# Patient Record
Sex: MALE | Race: WHITE | NOT HISPANIC OR LATINO | Employment: OTHER | URBAN - METROPOLITAN AREA
[De-identification: names, ages, dates, MRNs, and addresses within clinical notes are randomized per-mention and may not be internally consistent; named-entity substitution may affect disease eponyms.]

---

## 2017-04-13 ENCOUNTER — COMMUNICATION - HEALTHEAST (OUTPATIENT)
Dept: SCHEDULING | Facility: CLINIC | Age: 52
End: 2017-04-13

## 2017-04-14 ENCOUNTER — OFFICE VISIT - HEALTHEAST (OUTPATIENT)
Dept: INTERNAL MEDICINE | Facility: CLINIC | Age: 52
End: 2017-04-14

## 2017-04-14 DIAGNOSIS — D64.9 ANEMIA: ICD-10-CM

## 2017-04-14 DIAGNOSIS — I10 ESSENTIAL HYPERTENSION WITH GOAL BLOOD PRESSURE LESS THAN 140/90: ICD-10-CM

## 2017-04-14 DIAGNOSIS — R06.02 SHORTNESS OF BREATH: ICD-10-CM

## 2017-04-14 LAB
ATRIAL RATE - MUSE: 77 BPM
DIASTOLIC BLOOD PRESSURE - MUSE: NORMAL MMHG
INTERPRETATION ECG - MUSE: NORMAL
P AXIS - MUSE: 57 DEGREES
PR INTERVAL - MUSE: 184 MS
QRS DURATION - MUSE: 78 MS
QT - MUSE: 374 MS
QTC - MUSE: 423 MS
R AXIS - MUSE: 23 DEGREES
SYSTOLIC BLOOD PRESSURE - MUSE: NORMAL MMHG
T AXIS - MUSE: 14 DEGREES
VENTRICULAR RATE- MUSE: 77 BPM

## 2017-04-14 ASSESSMENT — MIFFLIN-ST. JEOR: SCORE: 1767.72

## 2017-04-19 ENCOUNTER — COMMUNICATION - HEALTHEAST (OUTPATIENT)
Dept: INTERNAL MEDICINE | Facility: CLINIC | Age: 52
End: 2017-04-19

## 2017-04-27 ENCOUNTER — RECORDS - HEALTHEAST (OUTPATIENT)
Dept: ADMINISTRATIVE | Facility: OTHER | Age: 52
End: 2017-04-27

## 2017-05-25 ENCOUNTER — RECORDS - HEALTHEAST (OUTPATIENT)
Dept: ADMINISTRATIVE | Facility: OTHER | Age: 52
End: 2017-05-25

## 2017-06-23 ENCOUNTER — COMMUNICATION - HEALTHEAST (OUTPATIENT)
Dept: INTERNAL MEDICINE | Facility: CLINIC | Age: 52
End: 2017-06-23

## 2017-06-28 ENCOUNTER — COMMUNICATION - HEALTHEAST (OUTPATIENT)
Dept: INTERNAL MEDICINE | Facility: CLINIC | Age: 52
End: 2017-06-28

## 2017-07-05 ENCOUNTER — COMMUNICATION - HEALTHEAST (OUTPATIENT)
Dept: INTERNAL MEDICINE | Facility: CLINIC | Age: 52
End: 2017-07-05

## 2017-07-09 ENCOUNTER — COMMUNICATION - HEALTHEAST (OUTPATIENT)
Dept: INTERNAL MEDICINE | Facility: CLINIC | Age: 52
End: 2017-07-09

## 2017-07-09 DIAGNOSIS — I10 HYPERTENSION: ICD-10-CM

## 2017-07-13 ENCOUNTER — RECORDS - HEALTHEAST (OUTPATIENT)
Dept: ADMINISTRATIVE | Facility: OTHER | Age: 52
End: 2017-07-13

## 2017-08-11 ENCOUNTER — RECORDS - HEALTHEAST (OUTPATIENT)
Dept: ADMINISTRATIVE | Facility: OTHER | Age: 52
End: 2017-08-11

## 2017-09-11 ENCOUNTER — RECORDS - HEALTHEAST (OUTPATIENT)
Dept: ADMINISTRATIVE | Facility: OTHER | Age: 52
End: 2017-09-11

## 2017-11-06 ENCOUNTER — RECORDS - HEALTHEAST (OUTPATIENT)
Dept: ADMINISTRATIVE | Facility: OTHER | Age: 52
End: 2017-11-06

## 2017-12-07 ENCOUNTER — RECORDS - HEALTHEAST (OUTPATIENT)
Dept: ADMINISTRATIVE | Facility: OTHER | Age: 52
End: 2017-12-07

## 2018-01-05 ENCOUNTER — RECORDS - HEALTHEAST (OUTPATIENT)
Dept: ADMINISTRATIVE | Facility: OTHER | Age: 53
End: 2018-01-05

## 2018-02-08 ENCOUNTER — RECORDS - HEALTHEAST (OUTPATIENT)
Dept: ADMINISTRATIVE | Facility: OTHER | Age: 53
End: 2018-02-08

## 2018-03-07 ENCOUNTER — RECORDS - HEALTHEAST (OUTPATIENT)
Dept: ADMINISTRATIVE | Facility: OTHER | Age: 53
End: 2018-03-07

## 2018-03-15 ENCOUNTER — COMMUNICATION - HEALTHEAST (OUTPATIENT)
Dept: INTERNAL MEDICINE | Facility: CLINIC | Age: 53
End: 2018-03-15

## 2018-03-15 DIAGNOSIS — I10 HYPERTENSION: ICD-10-CM

## 2018-03-30 ENCOUNTER — COMMUNICATION - HEALTHEAST (OUTPATIENT)
Dept: INTERNAL MEDICINE | Facility: CLINIC | Age: 53
End: 2018-03-30

## 2018-04-05 ENCOUNTER — OFFICE VISIT - HEALTHEAST (OUTPATIENT)
Dept: INTERNAL MEDICINE | Facility: CLINIC | Age: 53
End: 2018-04-05

## 2018-04-05 DIAGNOSIS — Z12.5 ENCOUNTER FOR SCREENING FOR MALIGNANT NEOPLASM OF PROSTATE: ICD-10-CM

## 2018-04-05 DIAGNOSIS — Z12.11 SCREENING FOR COLON CANCER: ICD-10-CM

## 2018-04-05 DIAGNOSIS — I10 ESSENTIAL HYPERTENSION: ICD-10-CM

## 2018-04-05 DIAGNOSIS — Z00.00 ROUTINE GENERAL MEDICAL EXAMINATION AT A HEALTH CARE FACILITY: ICD-10-CM

## 2018-04-05 ASSESSMENT — MIFFLIN-ST. JEOR: SCORE: 1775.66

## 2018-04-06 ENCOUNTER — AMBULATORY - HEALTHEAST (OUTPATIENT)
Dept: LAB | Facility: CLINIC | Age: 53
End: 2018-04-06

## 2018-04-06 DIAGNOSIS — Z12.5 ENCOUNTER FOR SCREENING FOR MALIGNANT NEOPLASM OF PROSTATE: ICD-10-CM

## 2018-04-06 DIAGNOSIS — Z00.00 ROUTINE GENERAL MEDICAL EXAMINATION AT A HEALTH CARE FACILITY: ICD-10-CM

## 2018-04-06 LAB
ALBUMIN SERPL-MCNC: 4.3 G/DL (ref 3.5–5)
ALP SERPL-CCNC: 60 U/L (ref 45–120)
ALT SERPL W P-5'-P-CCNC: 47 U/L (ref 0–45)
ANION GAP SERPL CALCULATED.3IONS-SCNC: 14 MMOL/L (ref 5–18)
AST SERPL W P-5'-P-CCNC: 28 U/L (ref 0–40)
BILIRUB SERPL-MCNC: 0.5 MG/DL (ref 0–1)
BUN SERPL-MCNC: 17 MG/DL (ref 8–22)
CALCIUM SERPL-MCNC: 9.9 MG/DL (ref 8.5–10.5)
CHLORIDE BLD-SCNC: 98 MMOL/L (ref 98–107)
CHOLEST SERPL-MCNC: 272 MG/DL
CO2 SERPL-SCNC: 24 MMOL/L (ref 22–31)
CREAT SERPL-MCNC: 0.99 MG/DL (ref 0.7–1.3)
FASTING STATUS PATIENT QL REPORTED: YES
GFR SERPL CREATININE-BSD FRML MDRD: >60 ML/MIN/1.73M2
GLUCOSE BLD-MCNC: 110 MG/DL (ref 70–125)
HDLC SERPL-MCNC: 51 MG/DL
LDLC SERPL CALC-MCNC: 169 MG/DL
POTASSIUM BLD-SCNC: 4 MMOL/L (ref 3.5–5)
PROT SERPL-MCNC: 7.5 G/DL (ref 6–8)
PSA SERPL-MCNC: 1.4 NG/ML (ref 0–3.5)
SODIUM SERPL-SCNC: 136 MMOL/L (ref 136–145)
TRIGL SERPL-MCNC: 261 MG/DL

## 2018-04-10 ENCOUNTER — COMMUNICATION - HEALTHEAST (OUTPATIENT)
Dept: INTERNAL MEDICINE | Facility: CLINIC | Age: 53
End: 2018-04-10

## 2018-04-16 ENCOUNTER — COMMUNICATION - HEALTHEAST (OUTPATIENT)
Dept: INTERNAL MEDICINE | Facility: CLINIC | Age: 53
End: 2018-04-16

## 2018-06-16 ENCOUNTER — COMMUNICATION - HEALTHEAST (OUTPATIENT)
Dept: INTERNAL MEDICINE | Facility: CLINIC | Age: 53
End: 2018-06-16

## 2018-06-16 DIAGNOSIS — I10 HYPERTENSION: ICD-10-CM

## 2018-07-19 ENCOUNTER — RECORDS - HEALTHEAST (OUTPATIENT)
Dept: ADMINISTRATIVE | Facility: OTHER | Age: 53
End: 2018-07-19

## 2018-12-19 ENCOUNTER — RECORDS - HEALTHEAST (OUTPATIENT)
Dept: ADMINISTRATIVE | Facility: OTHER | Age: 53
End: 2018-12-19

## 2019-01-28 ENCOUNTER — TRANSFERRED RECORDS (OUTPATIENT)
Dept: HEALTH INFORMATION MANAGEMENT | Facility: CLINIC | Age: 54
End: 2019-01-28

## 2019-02-13 ENCOUNTER — TRANSFERRED RECORDS (OUTPATIENT)
Dept: HEALTH INFORMATION MANAGEMENT | Facility: CLINIC | Age: 54
End: 2019-02-13

## 2019-02-19 ENCOUNTER — RECORDS - HEALTHEAST (OUTPATIENT)
Dept: ADMINISTRATIVE | Facility: OTHER | Age: 54
End: 2019-02-19

## 2019-04-07 ENCOUNTER — COMMUNICATION - HEALTHEAST (OUTPATIENT)
Dept: INTERNAL MEDICINE | Facility: CLINIC | Age: 54
End: 2019-04-07

## 2019-04-07 DIAGNOSIS — I10 HYPERTENSION: ICD-10-CM

## 2019-05-03 ENCOUNTER — CARE COORDINATION (OUTPATIENT)
Dept: ANESTHESIOLOGY | Facility: CLINIC | Age: 54
End: 2019-05-03

## 2019-05-03 NOTE — PROGRESS NOTES
I called and spoke with Mel this afternoon at his request.     Pat wanted to discuss being seen for a thoracic RFA. He has had his bilateral nerve blocks done in [place] and one side of a thoracic RFA done. See records. He is wanting to get the other thoracic RFA done.    He was assisted to make a clinic appt, he will have his PCP fax in a referral for him to be seen in clinic to be cleared for the thoracic RFA.

## 2019-05-07 ENCOUNTER — OFFICE VISIT (OUTPATIENT)
Dept: ANESTHESIOLOGY | Facility: CLINIC | Age: 54
End: 2019-05-07
Payer: MEDICARE

## 2019-05-07 VITALS
HEART RATE: 113 BPM | RESPIRATION RATE: 16 BRPM | WEIGHT: 225 LBS | DIASTOLIC BLOOD PRESSURE: 95 MMHG | HEIGHT: 66 IN | BODY MASS INDEX: 36.16 KG/M2 | SYSTOLIC BLOOD PRESSURE: 135 MMHG

## 2019-05-07 DIAGNOSIS — M47.814 SPONDYLOSIS OF THORACIC REGION WITHOUT MYELOPATHY OR RADICULOPATHY: Primary | ICD-10-CM

## 2019-05-07 DIAGNOSIS — M25.78 DEGENERATION OF INTERVERTEBRAL DISC OF THORACIC REGION WITH OSTEOPHYTE: ICD-10-CM

## 2019-05-07 DIAGNOSIS — M51.34 DEGENERATION OF INTERVERTEBRAL DISC OF THORACIC REGION WITH OSTEOPHYTE: ICD-10-CM

## 2019-05-07 RX ORDER — HYDROCHLOROTHIAZIDE 25 MG/1
25 TABLET ORAL DAILY
COMMUNITY
Start: 2019-04-08 | End: 2022-03-28

## 2019-05-07 RX ORDER — LISINOPRIL 20 MG/1
TABLET ORAL DAILY
COMMUNITY
Start: 2019-04-08 | End: 2021-11-29

## 2019-05-07 RX ORDER — NALOXONE HYDROCHLORIDE 4 MG/.1ML
4 SPRAY NASAL
Refills: 1 | COMMUNITY
Start: 2018-06-09

## 2019-05-07 RX ORDER — OXYCODONE HYDROCHLORIDE 10 MG/1
20 TABLET ORAL PRN
COMMUNITY
Start: 2019-01-28 | End: 2022-03-28

## 2019-05-07 ASSESSMENT — MIFFLIN-ST. JEOR: SCORE: 1808.34

## 2019-05-07 ASSESSMENT — ENCOUNTER SYMPTOMS
STIFFNESS: 0
MYALGIAS: 1
MUSCLE WEAKNESS: 0
NECK PAIN: 0
BACK PAIN: 1
ARTHRALGIAS: 0
JOINT SWELLING: 0
MUSCLE CRAMPS: 0

## 2019-05-07 ASSESSMENT — ANXIETY QUESTIONNAIRES
7. FEELING AFRAID AS IF SOMETHING AWFUL MIGHT HAPPEN: NOT AT ALL
GAD7 TOTAL SCORE: 3
4. TROUBLE RELAXING: NOT AT ALL
5. BEING SO RESTLESS THAT IT IS HARD TO SIT STILL: NOT AT ALL
6. BECOMING EASILY ANNOYED OR IRRITABLE: NEARLY EVERY DAY
1. FEELING NERVOUS, ANXIOUS, OR ON EDGE: NOT AT ALL
3. WORRYING TOO MUCH ABOUT DIFFERENT THINGS: NOT AT ALL
2. NOT BEING ABLE TO STOP OR CONTROL WORRYING: NOT AT ALL
GAD7 TOTAL SCORE: 3
7. FEELING AFRAID AS IF SOMETHING AWFUL MIGHT HAPPEN: NOT AT ALL

## 2019-05-07 ASSESSMENT — PAIN SCALES - GENERAL: PAINLEVEL: MODERATE PAIN (5)

## 2019-05-07 NOTE — LETTER
"5/7/2019       RE: Luca Jack  [address]  [place] [address]     Dear Colleague,    Thank you for referring your patient, Luca Jack, to the [facility] at [facility]. Please see a copy of my visit note below.    I had the pleasure of meeting Mr. Luca Jack on 5/7/2019 in the outpatient pain clinic in consult for Dr. Law with regards to his thoracic back pain.   HPI:  Patient presents with past medical history of HTN, opioid dependence for therapeutic purpose; he is here today for evaluation of thoracic back pain.  He was in [place] for the winter where he underwent thoracic medial branch blocks with excellent relief. He had right-sided RFA performed on 2/13/19 but needed to return to [place] prior to left side being performed. He is here today for evaluation of left-sided thoracic RFA. All records have been obtained and reviewed from [facility].        He has attended multiple pain clinics in the past, including [facility], [facility], [facility], and [facility]. He is motivated to not utilize opioid therapy.     Location: midline thoracic back  Quality: spasm, aching    Severity: 5/10  Timing: constant; waxes and wanes  Duration: 2006  Context: no inciting event  Aggravating Factors: stress   Relieving Factors: relaxation, lying down, R thoracic RFA  Associated Signs/Symptoms: denies radicular symptoms     He has tried the following therapies for his pain:  Medications:   Current:  -oxycodone 20 mg PRN  Past:  -acetaminophen 4 g/day  -gabapentin; s/e   -morphine  -Fentanyl patch  Physical Therapy:  Multiple sessions of PT; patient feels PT \"core strengthening\" worsens pain  Injections/Interventions:  -T9, 10, 11, 12 bilateral MBB; 1/9/19 - 80-90% relief  -T9, 10, 11, 12 bilateral MBB; 1/16/19 - 80-90% relief  -T9, 10, 11, 12 right radiofrequency ablation; 2/13/19  Pain Psychology: yes; " "not helpful     Other Modalities:  Chiropractic care: yes; summer 2018  Acupuncture: yes; summer 2018  TENS: yes  Water Based Therapy: yes; helpful    No past medical history on file. past medical history reviewed with patient.   No past surgical history on file. past surgical history reviewed with patient.   Medications:  Current Outpatient Medications   Medication     hydrochlorothiazide (HYDRODIURIL) 25 MG tablet     lisinopril (PRINIVIL/ZESTRIL) 20 MG tablet     NARCAN 4 MG/0.1ML nasal spray     oxyCODONE IR (ROXICODONE) 10 MG tablet     No current facility-administered medications for this visit.      A multi-state Prescription Monitoring Program reviewed and no aberrancies noted.     Allergies:   No Known Allergies  Family History:  No family history on file.   Social History     Tobacco Use     Smoking status: Never Smoker   Substance Use Topics     Alcohol use: Not on file     Drug use: Not on file          ROS:  A 14 point review of systems was completed; results are listed at end of note.     Objective:   BP (!) 135/95   Pulse 113   Resp 16   Ht 1.676 m (5' 6\")   Wt 102.1 kg (225 lb)   BMI 36.32 kg/m     Body mass index is 36.32 kg/m .  General: In no apparent distress  Mental status: Normal mood and affect, pleasant  Neuro: Alert, oriented. No sensory deficits.   Head: Atraumatic, normocephalic  Eyes: Extra-ocular movements grossly intact, no scleral icterus  Neck: Supple, Range of motion full  Respiratory: Non-labored breathing; No respiratory distress. Lungs CTA  CV: Normal S1, S2  Skin: No rashes or lesions noted on exposed areas of skin  Msk: Thoracic spine.   No spinous process tenderness with palpation of thoracic vertebrae. Paraspinal hypertrophy.   FROM with flexion, extension, and lateral bending without pain.  Strength 5/5 bilaterally: grasp, deltoid, biceps, triceps.   Gait is slow; antalgic, but symmetrical.    Assessment:  Luca Jack is a 53 year old male who is seen at the pain " clinic for multilevel thoracic spondylosis with osteophyte complex at T11-12. Strong myofascial component.     Plan:   As patient has obtained 80-90% relief from bilateral thoracic medial branch blocks, will plan to proceed with radiofrequency ablation of T9, T10, T11, T12 on LEFT. Procedure explained to patient and all questions answered. He will follow up in clinic 4-6 weeks following procedure.     Total time spent was  25 minutes, and more than 50% of face to face time was spent in counseling and/or coordination of care regarding the above plan.    Thank you for the consult.   TULIO Prieto, [facility]  Pain and Interventional Clinic          Answers for HPI/ROS submitted by the patient on 5/7/2019   MARLI 7 TOTAL SCORE: 3  General Symptoms: No  Skin Symptoms: No  HENT Symptoms: No  EYE SYMPTOMS: No  HEART SYMPTOMS: No  LUNG SYMPTOMS: No  INTESTINAL SYMPTOMS: No  URINARY SYMPTOMS: No  REPRODUCTIVE SYMPTOMS: No  SKELETAL SYMPTOMS: Yes  BLOOD SYMPTOMS: No  NERVOUS SYSTEM SYMPTOMS: No  MENTAL HEALTH SYMPTOMS: No  Back pain: Yes  Muscle aches: Yes  Neck pain: No  Swollen joints: No  Joint pain: No  Bone pain: No  Muscle cramps: No  Muscle weakness: No  Joint stiffness: No  Bone fracture: No      Again, thank you for allowing me to participate in the care of your patient.      Sincerely,    TULIO Prieto CNP

## 2019-05-07 NOTE — NURSING NOTE
LPN reviewed AVS with Pt.  Pt verbalized an understanding of information, and was asked to contact clinic with questions.    LPN read through the instructions with pt for the recommended procedure: Left sided Thoracic Radio-frequency Ablation.  Pt verbalized understanding to holding appropriate medication per protocol, and was agreeable to NPO policy and needing a .    Dari Lewis LPN

## 2019-05-07 NOTE — LETTER
Date:May 8, 2019      Patient was self referred, no letter generated. Do not send.        [facility] Information

## 2019-05-07 NOTE — PROGRESS NOTES
"I had the pleasure of meeting Mr. Luca Jack on 5/7/2019 in the outpatient pain clinic in consult for Dr. Law with regards to his thoracic back pain.   HPI:  Patient presents with past medical history of HTN, opioid dependence for therapeutic purpose; he is here today for evaluation of thoracic back pain.  He was in [place] for the winter where he underwent thoracic medial branch blocks with excellent relief. He had right-sided RFA performed on 2/13/19 but needed to return to [place] prior to left side being performed. He is here today for evaluation of left-sided thoracic RFA. All records have been obtained and reviewed from [facility].        He has attended multiple pain clinics in the past, including [facility], [facility], [facility], and [facility]. He is motivated to not utilize opioid therapy.     Location: midline thoracic back  Quality: spasm, aching    Severity: 5/10  Timing: constant; waxes and wanes  Duration: 2006  Context: no inciting event  Aggravating Factors: stress   Relieving Factors: relaxation, lying down, R thoracic RFA  Associated Signs/Symptoms: denies radicular symptoms     He has tried the following therapies for his pain:  Medications:   Current:  -oxycodone 20 mg PRN  Past:  -acetaminophen 4 g/day  -gabapentin; s/e   -morphine  -Fentanyl patch  Physical Therapy:  Multiple sessions of PT; patient feels PT \"core strengthening\" worsens pain  Injections/Interventions:  -T9, 10, 11, 12 bilateral MBB; 1/9/19 - 80-90% relief  -T9, 10, 11, 12 bilateral MBB; 1/16/19 - 80-90% relief  -T9, 10, 11, 12 right radiofrequency ablation; 2/13/19  Pain Psychology: yes; not helpful     Other Modalities:  Chiropractic care: yes; summer 2018  Acupuncture: yes; summer 2018  TENS: yes  Water Based Therapy: yes; helpful    No past medical history on file. past medical history reviewed with patient.   No past surgical history on file. past surgical history reviewed with patient. " "  Medications:  Current Outpatient Medications   Medication     hydrochlorothiazide (HYDRODIURIL) 25 MG tablet     lisinopril (PRINIVIL/ZESTRIL) 20 MG tablet     NARCAN 4 MG/0.1ML nasal spray     oxyCODONE IR (ROXICODONE) 10 MG tablet     No current facility-administered medications for this visit.      A multi-state Prescription Monitoring Program reviewed and no aberrancies noted.     Allergies:   No Known Allergies  Family History:  No family history on file.   Social History     Tobacco Use     Smoking status: Never Smoker   Substance Use Topics     Alcohol use: Not on file     Drug use: Not on file          ROS:  A 14 point review of systems was completed; results are listed at end of note.     Objective:   BP (!) 135/95   Pulse 113   Resp 16   Ht 1.676 m (5' 6\")   Wt 102.1 kg (225 lb)   BMI 36.32 kg/m    Body mass index is 36.32 kg/m .  General: In no apparent distress  Mental status: Normal mood and affect, pleasant  Neuro: Alert, oriented. No sensory deficits.   Head: Atraumatic, normocephalic  Eyes: Extra-ocular movements grossly intact, no scleral icterus  Neck: Supple, Range of motion full  Respiratory: Non-labored breathing; No respiratory distress. Lungs CTA  CV: Normal S1, S2  Skin: No rashes or lesions noted on exposed areas of skin  Msk: Thoracic spine.   No spinous process tenderness with palpation of thoracic vertebrae. Paraspinal hypertrophy.   FROM with flexion, extension, and lateral bending without pain.  Strength 5/5 bilaterally: grasp, deltoid, biceps, triceps.   Gait is slow; antalgic, but symmetrical.    Assessment:  Luca Jack is a 53 year old male who is seen at the pain clinic for multilevel thoracic spondylosis with osteophyte complex at T11-12. Strong myofascial component.     Plan:   As patient has obtained 80-90% relief from bilateral thoracic medial branch blocks, will plan to proceed with radiofrequency ablation of T9, T10, T11, T12 on LEFT. Procedure explained to " patient and all questions answered. He will follow up in clinic 4-6 weeks following procedure.     Total time spent was  25 minutes, and more than 50% of face to face time was spent in counseling and/or coordination of care regarding the above plan.    Thank you for the consult.   TULIO Prieto, [facility]  Pain and Interventional Clinic          Answers for HPI/ROS submitted by the patient on 5/7/2019   MARLI 7 TOTAL SCORE: 3  General Symptoms: No  Skin Symptoms: No  HENT Symptoms: No  EYE SYMPTOMS: No  HEART SYMPTOMS: No  LUNG SYMPTOMS: No  INTESTINAL SYMPTOMS: No  URINARY SYMPTOMS: No  REPRODUCTIVE SYMPTOMS: No  SKELETAL SYMPTOMS: Yes  BLOOD SYMPTOMS: No  NERVOUS SYSTEM SYMPTOMS: No  MENTAL HEALTH SYMPTOMS: No  Back pain: Yes  Muscle aches: Yes  Neck pain: No  Swollen joints: No  Joint pain: No  Bone pain: No  Muscle cramps: No  Muscle weakness: No  Joint stiffness: No  Bone fracture: No

## 2019-05-07 NOTE — PATIENT INSTRUCTIONS
1. Call to schedule you procedure.     When calling to schedule your procedure appointment, also make your follow up clinic appointment 4-6 weeks after the procedure.    Please call [phone] to schedule, reschedule, or cancel your procedure appointment.   Phones are answered Monday - Friday from 7:30 - 4:00pm.  Leave a voicemail with your name, birth date, and phone number if no one is available to take your call.     Your procedure: Left sided Thoracic Radio-frequency Ablation.    On the day of the procedure  1. Arrive 1 hour earlier than your scheduled time, to the [facility]  Address: [address] [place] [id number]  2. Check in on the 5th floor for your procedure    If you must reschedule your procedure more than two times, you must follow up in clinic before rescheduling again.    Preparing for your procedure    CAUTION - FAILURE TO FOLLOW THESE PRE-PROCEDURE INSTRUCTIONS WILL RESULT IN YOUR PROCEDURE BEING RESCHEDULED.            You must have a  take you home after your procedure. Transportation by taxi or para-transit is okay as long as you have a responsible adult accompany you. You must provide your 's full name and contact number at time of check in.     Fasting Protocol You may have NOTHING SOLID TO EAT 8 HOURS prior to arrival at the procedure area.     You may have CLEAR LIQUIDS UP TO 2 HOURS prior to arrival.    Broth and candy are considered solid food and require an eight hour fast.     Clear liquids include water, clear fruit juice (no pulp), carbonated beverages, ice, black coffee, black tea, clear jello. No alcohol containing beverages.   Medications If you take any medications, DO NOT STOP. Take your medications as usual the day of your procedure with a sip of water AT LEAST 2 HOURS PRIOR TO ARRIVAL.    Antibiotics If you are currently taking antibiotics, you must complete the entire dose 7 days prior to your scheduled procedure. You must be clear  of any signs or symptoms of infection. If you begin antibiotics, please contact our clinic for instructions.     Fever, Chills, or Rash If you experience a fever of higher than 100 degrees, chills, rash, or open wounds during the one week before your procedure, please call the clinic to see if you may proceed with your procedure.      Medication Hold List  **Patients under Cardiology/Neurology care should consult their provider prior to the pain procedure to verify pre-procedure medication instructions. The information below contains general guidelines.**    Blood Thinners If you are taking daily ASPIRIN, PLAVIX, OR OTHER BLOOD THINNERS SUCH AS COUMADIN/WARFARIN, we will need your prescribing doctor to sign a release permitting you to stop these medications. Once approved by your prescribing doctor - STOP ALL BLOOD THINNERS BASED ON THE TIME TABLE BELOW PRIOR TO YOUR PROCEDURE. If you have been instructed to stop WARFARIN(COUMADIN), you must have an INR lab drawn the day before your procedure. . Your INR must be within normal limits before we can perform your injection. MEDICATIONS CAN BE RESTARTED AFTER YOUR PROCEDURE.    14 DAY HOLD  Ticlid (ticlopidine)    10 DAY HOLD  Effient (Prasugel)    3 DAY HOLD  Xarelto (rivaroxaban) 7 DAY HOLD  Anacin, Bufferin, Ecotrin, Excedrin, Aggrenox (Aspirin)  Brilinta (ticagrelor)  Coumadin (Warfarin)  Pradexa (Dabigatran)  Elmiron (Pentosan)  Plavix (Clopidogrel Bisulfate)  Pletal (Cilostazol)    24 HOUR HOLD  Lovenox (enoxaparin)  Agrylin (Anagrelide)        Non-steroidal Anti-inflammatories (NSAIDs) DO NOT TAKE any non-steroidal anti-inflammatory medications (NSAIDs) listed on the table below. MEDICATIONS CAN BE RESTARTED AFTER YOUR PROCEDURE. Celebrex is OK to take and does not need to be discontinued.     Medications to stop:  3 DAY HOLD  Advil, Motrin (Ibuprofen)  Arthrotec (diciofenac sodium/misoprostol)  Clinoril (Sulindac)  Indocin (Indomethacin)  Lodine  (Etodolac)  Toradol (Ketorolac)  Vicoprofen (Hydrocodone and Ibuprofen)  Voltaren (Diclotenac)    14 DAY HOLD  Daypro (Oxaprozin)  Feldene (Piroxicam)   7 DAY HOLD  Aleve (Naproxen sodium)  Darvon compound (contains aspirin)  Naprosyn (Naproxen)  Norgesic Forte (contains aspirin)  Mobic (Meloxicam)  Oruvall (Ketoprofen)  Percodan (contains aspirin)  Relafen (Nabumetone)  Salsalate  Trilisate  Vitamin E (more than 400 mg per day)  Any medication containing aspirin                To speak with a nurse, schedule/reschedule/cancel a clinic appointment, or request a medication refill call: [phone]     You can also reach us by [employer]: [email or web address]

## 2019-05-08 ASSESSMENT — ANXIETY QUESTIONNAIRES: GAD7 TOTAL SCORE: 3

## 2019-05-10 ENCOUNTER — TELEPHONE (OUTPATIENT)
Dept: ANESTHESIOLOGY | Facility: CLINIC | Age: 54
End: 2019-05-10

## 2019-05-10 ENCOUNTER — OFFICE VISIT - HEALTHEAST (OUTPATIENT)
Dept: INTERNAL MEDICINE | Facility: CLINIC | Age: 54
End: 2019-05-10

## 2019-05-10 DIAGNOSIS — Z12.11 SCREEN FOR COLON CANCER: ICD-10-CM

## 2019-05-10 DIAGNOSIS — I10 HYPERTENSION: ICD-10-CM

## 2019-05-10 LAB
ALBUMIN SERPL-MCNC: 4.2 G/DL (ref 3.5–5)
ALP SERPL-CCNC: 52 U/L (ref 45–120)
ALT SERPL W P-5'-P-CCNC: 29 U/L (ref 0–45)
ANION GAP SERPL CALCULATED.3IONS-SCNC: 11 MMOL/L (ref 5–18)
AST SERPL W P-5'-P-CCNC: 22 U/L (ref 0–40)
BILIRUB SERPL-MCNC: 0.3 MG/DL (ref 0–1)
BUN SERPL-MCNC: 19 MG/DL (ref 8–22)
CALCIUM SERPL-MCNC: 10 MG/DL (ref 8.5–10.5)
CHLORIDE BLD-SCNC: 105 MMOL/L (ref 98–107)
CO2 SERPL-SCNC: 24 MMOL/L (ref 22–31)
CREAT SERPL-MCNC: 1.08 MG/DL (ref 0.7–1.3)
GFR SERPL CREATININE-BSD FRML MDRD: >60 ML/MIN/1.73M2
GLUCOSE BLD-MCNC: 119 MG/DL (ref 70–125)
POTASSIUM BLD-SCNC: 4.2 MMOL/L (ref 3.5–5)
PROT SERPL-MCNC: 7 G/DL (ref 6–8)
SODIUM SERPL-SCNC: 140 MMOL/L (ref 136–145)

## 2019-05-10 NOTE — TELEPHONE ENCOUNTER
Spoke with: Aliya     Date Scheduled:  6/17/19     Provider: Dr. Spence     : Yes     F/U Appointment: 7/29/19     Patient was educated on pre-op nurse call: Yes      Direct procedure:   No

## 2019-05-10 NOTE — TELEPHONE ENCOUNTER
Attempted to reach out to patient to assist with scheduling injection. Left detailed message informing patient that first available would be 5/21in the AM with Dr. Lawrence. Left best call back number of [phone]

## 2019-05-13 ENCOUNTER — COMMUNICATION - HEALTHEAST (OUTPATIENT)
Dept: INTERNAL MEDICINE | Facility: CLINIC | Age: 54
End: 2019-05-13

## 2019-05-24 ENCOUNTER — AMBULATORY - HEALTHEAST (OUTPATIENT)
Dept: NURSING | Facility: CLINIC | Age: 54
End: 2019-05-24

## 2019-06-16 ENCOUNTER — ANESTHESIA EVENT (OUTPATIENT)
Dept: SURGERY | Facility: AMBULATORY SURGERY CENTER | Age: 54
End: 2019-06-16

## 2019-06-17 ENCOUNTER — ANCILLARY PROCEDURE (OUTPATIENT)
Dept: RADIOLOGY | Facility: AMBULATORY SURGERY CENTER | Age: 54
End: 2019-06-17
Attending: ANESTHESIOLOGY
Payer: MEDICARE

## 2019-06-17 ENCOUNTER — HOSPITAL ENCOUNTER (OUTPATIENT)
Facility: AMBULATORY SURGERY CENTER | Age: 54
End: 2019-06-17
Attending: ANESTHESIOLOGY
Payer: MEDICARE

## 2019-06-17 ENCOUNTER — ANESTHESIA (OUTPATIENT)
Dept: SURGERY | Facility: AMBULATORY SURGERY CENTER | Age: 54
End: 2019-06-17

## 2019-06-17 VITALS
TEMPERATURE: 98.2 F | SYSTOLIC BLOOD PRESSURE: 123 MMHG | RESPIRATION RATE: 16 BRPM | OXYGEN SATURATION: 93 % | HEIGHT: 66 IN | BODY MASS INDEX: 33.75 KG/M2 | HEART RATE: 97 BPM | DIASTOLIC BLOOD PRESSURE: 76 MMHG | WEIGHT: 210 LBS

## 2019-06-17 DIAGNOSIS — R52 PAIN: ICD-10-CM

## 2019-06-17 RX ORDER — BUPIVACAINE HYDROCHLORIDE 5 MG/ML
INJECTION, SOLUTION PERINEURAL PRN
Status: DISCONTINUED | OUTPATIENT
Start: 2019-06-17 | End: 2019-06-17 | Stop reason: HOSPADM

## 2019-06-17 RX ORDER — DEXAMETHASONE SODIUM PHOSPHATE 10 MG/ML
INJECTION, SOLUTION INTRAMUSCULAR; INTRAVENOUS PRN
Status: DISCONTINUED | OUTPATIENT
Start: 2019-06-17 | End: 2019-06-17 | Stop reason: HOSPADM

## 2019-06-17 RX ORDER — FENTANYL CITRATE 50 UG/ML
25-50 INJECTION, SOLUTION INTRAMUSCULAR; INTRAVENOUS EVERY 5 MIN PRN
Status: DISCONTINUED | OUTPATIENT
Start: 2019-06-17 | End: 2019-06-18 | Stop reason: HOSPADM

## 2019-06-17 RX ORDER — NALOXONE HYDROCHLORIDE 0.4 MG/ML
.1-.4 INJECTION, SOLUTION INTRAMUSCULAR; INTRAVENOUS; SUBCUTANEOUS
Status: DISCONTINUED | OUTPATIENT
Start: 2019-06-17 | End: 2019-06-18 | Stop reason: HOSPADM

## 2019-06-17 RX ORDER — LIDOCAINE HYDROCHLORIDE 10 MG/ML
INJECTION, SOLUTION EPIDURAL; INFILTRATION; INTRACAUDAL; PERINEURAL PRN
Status: DISCONTINUED | OUTPATIENT
Start: 2019-06-17 | End: 2019-06-17 | Stop reason: HOSPADM

## 2019-06-17 RX ORDER — FLUMAZENIL 0.1 MG/ML
0.2 INJECTION, SOLUTION INTRAVENOUS
Status: DISCONTINUED | OUTPATIENT
Start: 2019-06-17 | End: 2019-06-18 | Stop reason: HOSPADM

## 2019-06-17 ASSESSMENT — MIFFLIN-ST. JEOR: SCORE: 1740.3

## 2019-06-17 NOTE — DISCHARGE INSTRUCTIONS
Home Care Instructions after a Radio Frequency Ablation    A radiofrequency ablation is a procedure that destroys the functionality of the nerve using radiofrequency energy. There are two primary types of radiofrequency ablation: A medial branch neurotomy (ablation) affects the nerves carrying pain from the facet joints, while a lateral branch neurotomy (ablation) affects nerves that carry pain from the sacroiliac joints. The physician uses x-ray guidance (fluoroscopy) to direct a special (radiofrequency) needle alongside the medial or lateral branch nerves. A small amount of electrical current is often carefully passed through the needle to assure it is next to the target nerve and a safe distance from other nerves. This current should briefly recreate the usual pain and cause a muscle twitch in the target area. The nerves will then be numbed to minimize pain while the lesion is being created. The radiofrequency waves are introduced to heat the tip of the needle and a heat lesion is created on the nerve to disrupt the nerve's ability to send pain signals. Please follow the aftercare instructions regarding your procedure.    Activity  -Rest today  -Do not work today  -Resume normal activity in 2-3 days  -No heavy lifting, turning or twisting for 24 hours  -DO NOT shower or soak in tub for 24 hours  -DO NOT remove bandaid for 24 hours    Pain  -You may experience soreness at the injection site for one or two days  -You may use an ice pack for 20 minutes every 2 hours for the first 24 hours, longer if needed for comfort, do not use heat  -You may use Tylenol (acetaminophen) every 4 hours or other pain medicines as directed by your physician  -If other pain medications are prescribed by your physician, please follow dosing instructions carefully.    What to expect  You may experience numbness radiating into your legs or arms (depending on the procedure location). This numbness may last several hours. Until sensation  returns to normal, please use caution in walking, climbing stairs, and stepping out of your vehicle, etc. Relief of your initial symptoms can take up to 4 weeks to feel better, this is normal and due to the healing process. The procedure site may feel like a deep burn. Using ice will greatly minimize this discomfort. Do not use numbing creams or patches over your injection sites immediately following your procedure. Please keep injection sites covered, clean and dry for 24 hours.    DID YOU RECEIVE SEDATION TODAY?  Yes    Safety  Sedation medicine, if given, may remain active for many hours. It is important for the next 24 hours that you do not:  -Drive a car  -Operate machines or power tools  -Consume alcohol, including beer  -Sign any important papers or legal documents  -Please have a responsible adult with you for 24 hours following any sedation    DID YOU RECEIVE STEROIDS TODAY?  Yes    Common side effects of steroids:  Not everyone will experience corticosteroid side effects. If side effects are experienced, they will gradually subside in the 7-10 day period following an injection. Most common side effects include:  -Flushed face and/or chest  -Feeling of warmth, particularly in the face but could be an overall feeling of warmth  -Increased blood sugar in diabetic patients  -Menstrual irregularities my occur. If taking hormone-based birth control an alternate method of birth control is recommended  -Sleep disturbances and/or mood swings are possible  -Leg cramps      Please contact us if you have:  -Severe pain  -Fever more than 101.5 degrees Fahrenheit  -Signs of infection at the injection site (redness, swelling, or drainage)    If you have questions, please contact our office at [phone] between the hours of 7:00 am and 3:00 pm Monday through Friday. After office hours you can contact the on call provider by dialing [phone]. If you need immediate attention, we recommend that you go to a hospital  emergency room or dial 911.

## 2019-06-17 NOTE — OP NOTE
Patient: Luca Jack Age: 53 year old   MRN: [id number] Attending: Dr. Spence     Date of Visit: June 17, 2019      PAIN MEDICINE CLINIC PROCEDURE NOTE    ATTENDING CLINICIAN:    Jj Spence MD    ASSISTANT CLINICIAN:  Susan Centeno MD    PREPROCEDURE DIAGNOSES:  1. Thoracic facet arthropathy   2. Chronic low back pain     POSTPROCEDURE DIAGNOSES:  1. Thoracic facet arthropathy   2. Chronic low back pain     PROCEDURE(S) PERFORMED:  1. Procedure(s):  Left Radiofrequency Ablation of the Thoracic Facets, Thoracic 9, 10, 11, 12  2.  Fluoroscopic guidance for the above procedures    ANESTHESIA:  Local.    BLOOD LOSS:  Minimal.    DRAINS AND SPECIMENS:  None.    COMPLICATIONS:  None.    INDICATIONS:    Luca Jack is a 53 year old male with a history of back pain secondary to thoracolumbar facet joint arthopathy .  The patient stated that the patient was in their usual state of health and denied recent anticoagulant use or recent infections.  Therefore, the plan is for Procedure(s):  Left Radiofrequency Ablation of the Thoracic Facets, Thoracic 9, 10, 11, 12    Procedure Details:    The patient was met in the procedure room, where the patient was identified by name, medical record number and date of birth.  All of the patient s last minute questions were answered. Written informed consent was obtained and saved in the electronic medical record, after the risks, benefits, and alternatives were discussed with the patient.      A formal time-out procedure was performed by Dr. Spence, as per protocol, including patient name, title of procedure, and site of procedure, and all in the room concurred.  Routine monitors were applied.      The patient was placed in the prone position on the procedure room table.  All pressure points were checked and comfortably padded.  Routine monitors were placed.  Vital signs were stable.    A chlorhexidine prep was completed followed by sterile draping per standard procedure.      We identified each thoracic vertebral body after first identifying the 12th rib.  AP fluoroscopy was then obliqued towards the patient's left in order to identify the pedicles of the leftT10 vertebrae.  The targets were recognized at the 10 o'clock position of the left pedicle, which is a known location for T9 medial branch nerve. Skin and subcutaneous tissues overlying this area were anesthetized with a 1% lidocaine.  Under fluoroscopic guidance, we directed the 100 mm RFAl needle through the skin and subcutaneous tissues until osseous contact was achieved.  At that point, the stylet was removed 0.5 mL solution containing  lidcaine was injected. The exact same procedure repeated for  left T10, T11 medial branch nerves by placing needle at the 10 oclock position of T11, T12 pedicles. For L1 medial branch nerve, we rotated C-arm to 15 degrees on the left side and placed RFA needle at the junction of transverse process and superior articular process of the L1 vertebrae.     At that point, the stylet was removed from the RF needle and an RFA probe was then inserted. Sensory testing was appropriately responsive.  Motor testing was initiated with appropriate multifidus responses.  Ablation was then carried out at 80 degrees Celsius for 90 seconds. Then RFA needle is rotated to 180 degree a ablation was then commenced at the above settings for a second lesion. Once the RF lesion was completed, 1 cubic centimeter of a solution consisting of 1 cubic centimeter of 40 mg per cubic centimeters triamcinolone and 7 cubic centimeters of 0.25% bupivacaine was injected through each RFA needle for a total of 1 cubic centimeters at each level.        Light pressure was held at the puncture site(s) to prevent ecchymosis and oozing.  The patient's skin was cleansed, and hemostasis was confirmed.  Band-aids were applied to the needle injection site(s).      Condition:    The patient tolerated the procedure well and was monitored for  approximately 15 minutes afterward in the post procedure area.  There were no immediate post procedure complications noted.  The patient was then discharged to home as per protocol.     Patient condition  stable.        Pre-procedure pain score: 7/10  Post-procedure pain score: 0/10

## 2019-06-19 NOTE — H&P
"Abbreviated History and Physical    Patient Name: Luca Jack  YOB: 1965    Reason for Procedure: chronic pain    History:    No past medical history on file.    History of obstructive sleep apnea? No     History of problems with sedation? no    Physical exam:  /76   Pulse 97   Temp 98.2  F (36.8  C) (Temporal)   Resp 16   Ht 1.676 m (5' 6\")   Wt 95.3 kg (210 lb)   SpO2 93%   BMI 33.89 kg/m    General: in no apparent distress   Neuro: At least antigravity strength noted in all 4 extremities  Respiratory: Clear to ausculation bilaterally   Cardiac: Regular rate and rhythm  Skin: No rashes or lesions on exposed areas of skin    Medications:   Current Outpatient Medications   Medication     hydrochlorothiazide (HYDRODIURIL) 25 MG tablet     lisinopril (PRINIVIL/ZESTRIL) 20 MG tablet     oxyCODONE IR (ROXICODONE) 10 MG tablet     NARCAN 4 MG/0.1ML nasal spray     No current facility-administered medications for this encounter.        Allergies:   No Known Allergies    ASA Classification: 2    OK for local anesthetic use.     Jj Spence MD  Pain Medicine  [facility] Department of Anesthesia  6/19/2019        "

## 2019-06-26 ENCOUNTER — NURSE TRIAGE (OUTPATIENT)
Dept: CALL CENTER | Age: 54
End: 2019-06-26

## 2019-06-26 NOTE — TELEPHONE ENCOUNTER
[facility]: Nurse Triage Note  SITUATION/BACKGROUND                                                      Luca Jack is a 53 year old male who's mother calls for him with post procedure pain.    Description:  Sharp stabbing pain in back. It is the same as before the procedure. Steroid Headache as well.  Onset/duration:  Headache for the past 4-5 days, back pain past 2 days  Precip. factors:  Headache mom states is related to steroids use in procedure and back pain pt feels it is the procedure wearing off.  Associated symptoms:  none  Improves/worsens symptoms:  nothing  Pain scale (0-10)   9-10/10 per mom - patient was sleeping    MEDICATIONS:   Taking medication(s) as prescribed? N/A  Taking over the counter medication(s?) N/A  Any barriers to taking medication(s) as prescribed?  N/A  Medication(s) improving/managing symptoms?  N/A    Allergies: No Known Allergies    ASSESSMENT      Patient  Is SP Left Radiofrequency Ablation of the Thoracic Facets, Thoracic 9, 10, 11, 12 done on 6-17-19. His back pain has returned and it is difficult for him to walk due to pain - no leg weakness. The pain is in his back and does not radiate. No loss of bowel or bladder. He is also experiencing a severe headache for the past 4-5 days - mom states it is related to the steroids used during the procedure.  Patient is currently not using any oral pain medications.    Message will be sent to the pain clinic to address the pain and plan next steps.    [phone]    RECOMMENDATION/PLAN                                                      RECOMMENDED DISPOSITION:  Message sent to the clinic to call the patient back with plan.  Will comply with recommendation: Yes    If further questions/concerns or if symptoms do not improve, worsen or new symptoms develop, call your PCP or [phone] to talk with the Resident on call, as soon as possible.    Guideline used: back pain  Telephone Triage Protocols for Nurses,  Fifth Edition, Julie Briggs Kathleen M. Doege, RN

## 2019-06-26 NOTE — TELEPHONE ENCOUNTER
RNCC called back and spoke with pt's mother.  She was advised to have pt take OTC ibuprofen 400-600mg q6-8h PRN for pain, in addition to heat/cold therapy and possibly lidocaine topical gel as directed on package.  I advised pt's mother Aliya to call back if needed, but to anticipate symptoms starting to resolve over the next 2-3 weeks.  I encouraged her to call back at any time if needed.     Kusum Tesfaye, TRANG, BSN

## 2019-07-09 ENCOUNTER — OFFICE VISIT (OUTPATIENT)
Dept: ANESTHESIOLOGY | Facility: CLINIC | Age: 54
End: 2019-07-09
Payer: MEDICARE

## 2019-07-09 VITALS — SYSTOLIC BLOOD PRESSURE: 135 MMHG | OXYGEN SATURATION: 97 % | DIASTOLIC BLOOD PRESSURE: 97 MMHG | HEART RATE: 102 BPM

## 2019-07-09 DIAGNOSIS — G89.29 CHRONIC MIDLINE THORACIC BACK PAIN: Primary | ICD-10-CM

## 2019-07-09 DIAGNOSIS — M47.814 SPONDYLOSIS OF THORACIC REGION WITHOUT MYELOPATHY OR RADICULOPATHY: ICD-10-CM

## 2019-07-09 DIAGNOSIS — M54.6 CHRONIC MIDLINE THORACIC BACK PAIN: Primary | ICD-10-CM

## 2019-07-09 ASSESSMENT — PAIN SCALES - GENERAL: PAINLEVEL: SEVERE PAIN (7)

## 2019-07-09 NOTE — LETTER
"7/9/2019       RE: Luca Jack  [address]  [place] [address]     Dear Colleague,    Thank you for referring your patient, Luca Jack, to the [facility] CLINIC FOR COMPREHENSIVE PAIN MANAGEMENT at [facility]. Please see a copy of my visit note below.    Date of visit: 7/9/2019    Chief complaint:   Chief Complaint   Patient presents with     Pain Management     Follow up appointment       Interval history:  Luca Jack is a 53 year old male last seen by me on 5/7/19 for multilevel thoracic spondylosis with osteophyte complex at T11-12. Strong myofascial component. He was in [place] for the winter where he underwent thoracic medial branch blocks with excellent relief. He had right-sided RFA performed on 2/13/19 but needed to return to [place] prior to left side being performed. All records were obtained and reviewed from [facility]. He had documented 80-90% relief with thoracic T9 T10 T11 T12 medial branch blocks in [place]. Therefore, he underwent RFA with my colleague, Dr. Spence, on 6/17/19 at these levels. He states he did not obtain any relief from this procedure and continues to experience stabbing midline back pain. Thoracic MRI reviewed today with only findings as multilevel spondylosis without central or foraminal stenosis. Patient is uncertain if an epidural has ever been performed but does note that he \"can't handle\" steroid, reporting severe headache, facial flush, and irritability with prior administration.     Recommendations/plan at the last visit included:  As patient has obtained 80-90% relief from bilateral thoracic medial branch blocks, will plan to proceed with radiofrequency ablation of T9, T10, T11, T12 on LEFT. Procedure explained to patient and all questions answered. He will follow up in clinic 4-6 weeks following procedure.     Medications:  Current Outpatient Medications   Medication Sig Dispense Refill     hydrochlorothiazide " (HYDRODIURIL) 25 MG tablet 25 mg daily       lisinopril (PRINIVIL/ZESTRIL) 20 MG tablet daily       NARCAN 4 MG/0.1ML nasal spray   1     oxyCODONE IR (ROXICODONE) 10 MG tablet 20 mg as needed         Medical History: any changes in medical history since they were last seen? No    Review of Systems:  The 14 system ROS was reviewed from the intake questionnaire; results listed at end of note.     Physical Exam:  Blood pressure (!) 135/97, pulse 102, SpO2 97 %.  General: In no apparent distress  Mental status: Normal mood and affect, pleasant  Neuro: Alert, oriented. No sensory deficits.   Head: Atraumatic, normocephalic  Eyes: Extra-ocular movements grossly intact, no scleral icterus  Neck: Supple, Range of motion full  Respiratory Non-labored breathing; No respiratory distress  Skin: No rashes or lesions noted on exposed areas of skin  Msk: No spinous process tenderness with palpation of thoracic vertebrae. Paraspinal hypertrophy.   FROM with flexion, extension, and lateral bending without pain.    Assessment:   Luca Jack is a 53 year old male who is seen at the pain clinic for multilevel thoracic spondylosis with osteophyte complex at T11-12.     Plan:  -At this time, as patient has apparent complications with steroid administration, he does not wish to proceed with recommended thoracic epidural steroid injection. I reviewed with him that he has very little pathology to warrant radiofrequency ablation and I would be reluctant to repeat in the future without again performing medial branch blocks and/or consult with my colleagues. I discussed the possibility of myofascial pain as a component of his pain process as well. He is not overly motivated to engage in any additional management modalities at this time. Return to clinic as needed.     Total time spent was 15 minutes, and more than 50% of face to face time was spent in counseling and/or coordination of care regarding plan of care.    Radha Guallpa,  TULIO, ROBERT-BC  Pain Management  Department of Interventional Pain Management and Anesthesiology   [facility]          Again, thank you for allowing me to participate in the care of your patient.      Sincerely,    TULIO Prieto CNP

## 2019-07-09 NOTE — PROGRESS NOTES
"Date of visit: 7/9/2019    Chief complaint:   Chief Complaint   Patient presents with     Pain Management     Follow up appointment       Interval history:  Luca Jack is a 53 year old male last seen by me on 5/7/19 for multilevel thoracic spondylosis with osteophyte complex at T11-12. Strong myofascial component. He was in [place] for the winter where he underwent thoracic medial branch blocks with excellent relief. He had right-sided RFA performed on 2/13/19 but needed to return to [place] prior to left side being performed. All records were obtained and reviewed from [facility]. He had documented 80-90% relief with thoracic T9 T10 T11 T12 medial branch blocks in [place]. Therefore, he underwent RFA with my colleague, Dr. Spence, on 6/17/19 at these levels. He states he did not obtain any relief from this procedure and continues to experience stabbing midline back pain. Thoracic MRI reviewed today with only findings as multilevel spondylosis without central or foraminal stenosis. Patient is uncertain if an epidural has ever been performed but does note that he \"can't handle\" steroid, reporting severe headache, facial flush, and irritability with prior administration.     Recommendations/plan at the last visit included:  As patient has obtained 80-90% relief from bilateral thoracic medial branch blocks, will plan to proceed with radiofrequency ablation of T9, T10, T11, T12 on LEFT. Procedure explained to patient and all questions answered. He will follow up in clinic 4-6 weeks following procedure.     Medications:  Current Outpatient Medications   Medication Sig Dispense Refill     hydrochlorothiazide (HYDRODIURIL) 25 MG tablet 25 mg daily       lisinopril (PRINIVIL/ZESTRIL) 20 MG tablet daily       NARCAN 4 MG/0.1ML nasal spray   1     oxyCODONE IR (ROXICODONE) 10 MG tablet 20 mg as needed         Medical History: any changes in medical history since they were last seen? No    Review of Systems:  The 14 system " ROS was reviewed from the intake questionnaire; results listed at end of note.     Physical Exam:  Blood pressure (!) 135/97, pulse 102, SpO2 97 %.  General: In no apparent distress  Mental status: Normal mood and affect, pleasant  Neuro: Alert, oriented. No sensory deficits.   Head: Atraumatic, normocephalic  Eyes: Extra-ocular movements grossly intact, no scleral icterus  Neck: Supple, Range of motion full  Respiratory Non-labored breathing; No respiratory distress  Skin: No rashes or lesions noted on exposed areas of skin  Msk: No spinous process tenderness with palpation of thoracic vertebrae. Paraspinal hypertrophy.   FROM with flexion, extension, and lateral bending without pain.    Assessment:   Luca Jack is a 53 year old male who is seen at the pain clinic for multilevel thoracic spondylosis with osteophyte complex at T11-12.     Plan:  -At this time, as patient has apparent complications with steroid administration, he does not wish to proceed with recommended thoracic epidural steroid injection. I reviewed with him that he has very little pathology to warrant radiofrequency ablation and I would be reluctant to repeat in the future without again performing medial branch blocks and/or consult with my colleagues. I discussed the possibility of myofascial pain as a component of his pain process as well. He is not overly motivated to engage in any additional management modalities at this time. Return to clinic as needed.     Total time spent was 15 minutes, and more than 50% of face to face time was spent in counseling and/or coordination of care regarding plan of care.    TULIO Prieto, ROBERT-BC  Pain Management  Department of Interventional Pain Management and Anesthesiology   [facility]

## 2019-07-09 NOTE — LETTER
Date:July 10, 2019      Patient was self referred, no letter generated. Do not send.        [facility] Health Information

## 2019-07-25 ENCOUNTER — TELEPHONE (OUTPATIENT)
Dept: ANESTHESIOLOGY | Facility: CLINIC | Age: 54
End: 2019-07-25

## 2019-08-01 ENCOUNTER — NURSE TRIAGE (OUTPATIENT)
Dept: NURSING | Facility: CLINIC | Age: 54
End: 2019-08-01

## 2019-08-01 ENCOUNTER — OFFICE VISIT (OUTPATIENT)
Dept: ANESTHESIOLOGY | Facility: CLINIC | Age: 54
End: 2019-08-01
Payer: MEDICARE

## 2019-08-01 VITALS
HEIGHT: 66 IN | DIASTOLIC BLOOD PRESSURE: 87 MMHG | WEIGHT: 210 LBS | HEART RATE: 113 BPM | BODY MASS INDEX: 33.75 KG/M2 | SYSTOLIC BLOOD PRESSURE: 129 MMHG

## 2019-08-01 DIAGNOSIS — M79.18 MYOFASCIAL PAIN: Primary | ICD-10-CM

## 2019-08-01 RX ORDER — MELOXICAM 7.5 MG/1
7.5 TABLET ORAL DAILY
Qty: 35 TABLET | Refills: 1 | Status: SHIPPED | OUTPATIENT
Start: 2019-08-01 | End: 2022-03-28

## 2019-08-01 RX ORDER — METHOCARBAMOL 500 MG/1
500 TABLET, FILM COATED ORAL 4 TIMES DAILY PRN
Qty: 120 TABLET | Refills: 1 | Status: SHIPPED | OUTPATIENT
Start: 2019-08-01 | End: 2022-03-28

## 2019-08-01 ASSESSMENT — MIFFLIN-ST. JEOR: SCORE: 1740.3

## 2019-08-01 ASSESSMENT — PAIN SCALES - GENERAL: PAINLEVEL: EXTREME PAIN (8)

## 2019-08-01 NOTE — NURSING NOTE
LPN reviewed AVS with Pt.  Pt verbalized an understanding of information, and was asked to contact clinic with questions.    Dari Lewis LPN

## 2019-08-01 NOTE — LETTER
Date:August 27, 2019      Patient was self referred, no letter generated. Do not send.        [facility] Health Information

## 2019-08-01 NOTE — LETTER
8/1/2019       RE: Luca Jack  [address]  [place] [address]     Dear Colleague,    Thank you for referring your patient, Luca Jack, to the [facility] CLINIC FOR COMPREHENSIVE PAIN MANAGEMENT at [facility]. Please see a copy of my visit note below.    Interval history:  Luca Jack is a 53 year old male last seen in our clinic  for multilevel thoracic spondylosis with osteophyte complex at T11-12. Strong myofascial component. He was in [place] for the winter where he underwent thoracic medial branch blocks with excellent relief. He had right-sided RFA performed on 2/13/19 but needed to return to [place] prior to left side being performed.   All records were obtained and reviewed from [facility]. He had documented 80-90% relief with thoracic T9 T10 T11 T12 medial branch blocks in [place]. Therefore, he underwent RFA with my colleague, Dr. Spence, on 6/17/19 at these levels. He states he did not obtain any relief from this procedure and continues to experience stabbing midline back pain.He states the pain is been worse since the radiofrequency ablation. Thoracic MRI reviewed today with only findings as multilevel spondylosis without central or foraminal stenosis.      He has tried diclofenac, tizanidine, gabapentin, and Lidoderm in the past.  None of these agents have been particularly helpful.  He returns today for reevaluation and follow-up.  Medications:  Current Outpatient Prescriptions          Current Outpatient Medications   Medication Sig Dispense Refill     hydrochlorothiazide (HYDRODIURIL) 25 MG tablet 25 mg daily         lisinopril (PRINIVIL/ZESTRIL) 20 MG tablet daily         NARCAN 4 MG/0.1ML nasal spray     1     oxyCODONE IR (ROXICODONE) 10 MG tablet 20 mg as needed                Medical History: any changes in medical history since they were last seen? No     Review of Systems:  The 14 system ROS was reviewed from the intake  questionnaire; results listed at end of note.      Physical Exam:  Blood pressure (!) 135/97, pulse 102, SpO2 97 %.  General: In no apparent distress  Mental status: Normal mood and affect, pleasant  Neuro: Alert, oriented. No sensory deficits.   Head: Atraumatic, normocephalic  Eyes: Extra-ocular movements grossly intact, no scleral icterus  Neck: Supple, Range of motion full  Respiratory Non-labored breathing; No respiratory distress  Skin: No rashes or lesions noted on exposed areas of skin  Msk: No spinous process tenderness with palpation of thoracic vertebrae. Paraspinal hypertrophy.   FROM with flexion, extension, and lateral bending without pain.     Assessment:   Luca Jack is a 53 year old male who is seen at the pain clinic for multilevel thoracic spondylosis with osteophyte complex at T11-12.      Plan:  Patient is a 52-year-old gentleman with a history of thoracic spine pain, refractory to treatment with radiofrequency denervation of the thoracic facets. I discussed the possibility of myofascial pain as a component of his pain process as well. He is not overly motivated to engage in any additional management modalities at this time.  I have recommended adding Mobic to his medication regimen in addition to methocarbamol.  He is to call return to clinic as needed, approximately 8 weeks.      Again, thank you for allowing me to participate in the care of your patient.      Sincerely,    Zeke Lawrence MD

## 2019-08-01 NOTE — PATIENT INSTRUCTIONS
1. Mobic 7.5 mg- Take 1 tablet daily for pain.     2. Robaxin- 500 mg, Take 1 tablet up to 4 times daily as needed.       Follow up: 8 weeks in clinic.      We are relocating to the 5th floor after August 2, 2019. If your next appointment is after August 2nd, check in on the 5th floor to be seen for your next appointment.      To speak with a nurse, schedule/reschedule/cancel a clinic appointment, or request a medication refill call: [phone]     You can also reach us by EpicTopic: [email or web address]    For refills, please call on Monday, 1 week before your medication runs out. The doctors are not always in clinic, so this gives us time to get your prescriptions ready.  Please let us know the name of the medication you are requesting a refill of.

## 2019-08-02 ENCOUNTER — TELEPHONE (OUTPATIENT)
Dept: ANESTHESIOLOGY | Facility: CLINIC | Age: 54
End: 2019-08-02

## 2019-08-02 NOTE — TELEPHONE ENCOUNTER
Prior Authorization Retail Medication Request    Medication/Dose: methocarbamol (ROBAXIN) 500 MG tablet  ICD code (if different than what is on RX):    Previously Tried and Failed:    Rationale:      Insurance Name:  Medicare  Insurance ID:  [id number]      Pharmacy Information (if different than what is on RX)  Name:    Phone:

## 2019-08-02 NOTE — TELEPHONE ENCOUNTER
[facility] Call Center    Phone Message    May a detailed message be left on voicemail: yes    Reason for Call: Other: Pt already has high blood pressure and was put on meloxican and wants to make sure blood pressure is not going to go higher while being on this, please call to discuss further pt started taking medication yesterday and has not had any problems     Action Taken: Message routed to:  [facility] Pain Clinic

## 2019-08-02 NOTE — TELEPHONE ENCOUNTER
S: Mother calling about new medication.  B: Luca gave writer permission to speak with his mother on behalf of his health care.  Saw provider today was prescribed Mobic and Robaxin.  Mother concern about cardiac effects of Mobic.  Was told at pharmacy would need a prior authorization for Robaxin.   A: Writer advised Mother to tell Luca to call clinic in the morning.  R: Will call clinic in the morning.  Talia Tyler RN, [facility]

## 2019-08-05 NOTE — TELEPHONE ENCOUNTER
LPN called and spoke to the pt.   Pt was informed that the RNCC was not concerned about Meloxicam causing hypertension, however Pt was encouraged to bring in an updated medication list to their local pharmacist, so that they may look at all of their medications- in totality.  They would be able to make the determination on this medication.     Pt was agreeable and declined other concerns.     Dari Lewis LPN

## 2019-08-08 NOTE — TELEPHONE ENCOUNTER
Prior Authorization Approval    Authorization Effective Date: 8/8/2019  Authorization Expiration Date: 12/31/2019  Medication: methocarbamol (ROBAXIN) 500 MG tablet-APPROVED  Approved Dose/Quantity:  Reference #: [id number]   Insurance Company: [employer] Part D - Phone [phone] Fax [phone]  Expected CoPay:       CoPay Card Available:      Foundation Assistance Needed:    Which Pharmacy is filling the prescription (Not needed for infusion/clinic administered): [id number] - [place], [place] - [address]   Pharmacy Notified: Yes-Per pharmacy, patient paid cash for medication already. But noted that PA is approved.  Patient Notified: No

## 2019-08-08 NOTE — TELEPHONE ENCOUNTER
Central Prior Authorization Team   Phone: [phone]      PA Initiation    Medication: methocarbamol (ROBAXIN) 500 MG tablet-PA Pending  Insurance Company: [employer] Part D - Phone [phone] Fax [phone]  Pharmacy Filling the Rx: [id number] - [place], [place] - [address]  Filling Pharmacy Phone: [phone]  Filling Pharmacy Fax: [phone]  Start Date: 8/8/2019

## 2019-08-13 ENCOUNTER — OFFICE VISIT - HEALTHEAST (OUTPATIENT)
Dept: INTERNAL MEDICINE | Facility: CLINIC | Age: 54
End: 2019-08-13

## 2019-08-13 DIAGNOSIS — G89.29 OTHER CHRONIC PAIN: ICD-10-CM

## 2019-08-14 ENCOUNTER — COMMUNICATION - HEALTHEAST (OUTPATIENT)
Dept: SCHEDULING | Facility: CLINIC | Age: 54
End: 2019-08-14

## 2019-08-14 ENCOUNTER — COMMUNICATION - HEALTHEAST (OUTPATIENT)
Dept: INTERNAL MEDICINE | Facility: CLINIC | Age: 54
End: 2019-08-14

## 2019-08-14 DIAGNOSIS — K59.1 FUNCTIONAL DIARRHEA: ICD-10-CM

## 2019-08-14 DIAGNOSIS — M54.6 CHRONIC BILATERAL THORACIC BACK PAIN: ICD-10-CM

## 2019-08-14 DIAGNOSIS — G89.29 CHRONIC BILATERAL THORACIC BACK PAIN: ICD-10-CM

## 2019-08-15 ENCOUNTER — OFFICE VISIT - HEALTHEAST (OUTPATIENT)
Dept: INTERNAL MEDICINE | Facility: CLINIC | Age: 54
End: 2019-08-15

## 2019-08-15 DIAGNOSIS — M51.379 DEGENERATION OF LUMBAR OR LUMBOSACRAL INTERVERTEBRAL DISC: ICD-10-CM

## 2019-08-15 DIAGNOSIS — M47.814 SPONDYLOSIS OF THORACIC REGION WITHOUT MYELOPATHY OR RADICULOPATHY: ICD-10-CM

## 2019-08-15 DIAGNOSIS — I10 ESSENTIAL HYPERTENSION: ICD-10-CM

## 2019-08-15 DIAGNOSIS — F11.20 OPIOID TYPE DEPENDENCE, EPISODIC (H): ICD-10-CM

## 2019-08-19 ENCOUNTER — TELEPHONE (OUTPATIENT)
Dept: ANESTHESIOLOGY | Facility: CLINIC | Age: 54
End: 2019-08-19

## 2019-08-19 NOTE — TELEPHONE ENCOUNTER
Health Call Center    Phone Message    May a detailed message be left on voicemail: yes    Reason for Call: Other: pt calling to see if he can change providers. Pt was given medication that is not even touching his pain. Pt believes that the Dr is not listening to what the pt is not needs or his issues. Please call pt to discuss. Muscle relaxers are not helping the pt at all.     Action Taken: Message routed to:  [facility] ([facility] Pain clinic

## 2019-08-20 NOTE — TELEPHONE ENCOUNTER
I called and spoke with the pt and informed him that the Supervisor of our clinic stated that he should follow up with Dr. Lawrence about medications he prescribed not being effective for the patient and if there are any alternatives.    Pt stated verbal understanding and would follow up on 9/24/19 with Dr. Lawrence.    Nisa Ellison CMA

## 2019-08-21 ENCOUNTER — COMMUNICATION - HEALTHEAST (OUTPATIENT)
Dept: INTERNAL MEDICINE | Facility: CLINIC | Age: 54
End: 2019-08-21

## 2019-08-27 ENCOUNTER — OFFICE VISIT - HEALTHEAST (OUTPATIENT)
Dept: PHYSICAL THERAPY | Facility: REHABILITATION | Age: 54
End: 2019-08-27

## 2019-08-27 DIAGNOSIS — M47.814 SPONDYLOSIS OF THORACIC REGION WITHOUT MYELOPATHY OR RADICULOPATHY: ICD-10-CM

## 2019-08-27 DIAGNOSIS — R53.1 WEAKNESS: ICD-10-CM

## 2019-08-27 NOTE — PROGRESS NOTES
Interval history:  Luca Jack is a 53 year old male last seen in our clinic  for multilevel thoracic spondylosis with osteophyte complex at T11-12. Strong myofascial component. He was in [place] for the winter where he underwent thoracic medial branch blocks with excellent relief. He had right-sided RFA performed on 2/13/19 but needed to return to [place] prior to left side being performed.   All records were obtained and reviewed from [facility]. He had documented 80-90% relief with thoracic T9 T10 T11 T12 medial branch blocks in [place]. Therefore, he underwent RFA with my colleague, Dr. Spence, on 6/17/19 at these levels. He states he did not obtain any relief from this procedure and continues to experience stabbing midline back pain.He states the pain is been worse since the radiofrequency ablation. Thoracic MRI reviewed today with only findings as multilevel spondylosis without central or foraminal stenosis.      He has tried diclofenac, tizanidine, gabapentin, and Lidoderm in the past.  None of these agents have been particularly helpful.  He returns today for reevaluation and follow-up.  Medications:  Current Outpatient Prescriptions          Current Outpatient Medications   Medication Sig Dispense Refill     hydrochlorothiazide (HYDRODIURIL) 25 MG tablet 25 mg daily         lisinopril (PRINIVIL/ZESTRIL) 20 MG tablet daily         NARCAN 4 MG/0.1ML nasal spray     1     oxyCODONE IR (ROXICODONE) 10 MG tablet 20 mg as needed                Medical History: any changes in medical history since they were last seen? No     Review of Systems:  The 14 system ROS was reviewed from the intake questionnaire; results listed at end of note.      Physical Exam:  Blood pressure (!) 135/97, pulse 102, SpO2 97 %.  General: In no apparent distress  Mental status: Normal mood and affect, pleasant  Neuro: Alert, oriented. No sensory deficits.   Head: Atraumatic, normocephalic  Eyes: Extra-ocular movements grossly intact, no  scleral icterus  Neck: Supple, Range of motion full  Respiratory Non-labored breathing; No respiratory distress  Skin: No rashes or lesions noted on exposed areas of skin  Msk: No spinous process tenderness with palpation of thoracic vertebrae. Paraspinal hypertrophy.   FROM with flexion, extension, and lateral bending without pain.     Assessment:   Luca Jack is a 53 year old male who is seen at the pain clinic for multilevel thoracic spondylosis with osteophyte complex at T11-12.      Plan:  Patient is a 52-year-old gentleman with a history of thoracic spine pain, refractory to treatment with radiofrequency denervation of the thoracic facets. I discussed the possibility of myofascial pain as a component of his pain process as well. He is not overly motivated to engage in any additional management modalities at this time.  I have recommended adding Mobic to his medication regimen in addition to methocarbamol.  He is to call return to clinic as needed, approximately 8 weeks.

## 2019-09-12 ENCOUNTER — RECORDS - HEALTHEAST (OUTPATIENT)
Dept: ADMINISTRATIVE | Facility: OTHER | Age: 54
End: 2019-09-12

## 2019-09-24 ENCOUNTER — OFFICE VISIT (OUTPATIENT)
Dept: ANESTHESIOLOGY | Facility: CLINIC | Age: 54
End: 2019-09-24
Payer: MEDICARE

## 2019-09-24 VITALS
HEART RATE: 106 BPM | DIASTOLIC BLOOD PRESSURE: 99 MMHG | HEIGHT: 66 IN | SYSTOLIC BLOOD PRESSURE: 143 MMHG | RESPIRATION RATE: 16 BRPM | BODY MASS INDEX: 33.89 KG/M2

## 2019-09-24 DIAGNOSIS — M47.814 SPONDYLOSIS OF THORACIC REGION WITHOUT MYELOPATHY OR RADICULOPATHY: Primary | ICD-10-CM

## 2019-09-24 ASSESSMENT — PAIN SCALES - GENERAL: PAINLEVEL: WORST PAIN (10)

## 2019-09-24 NOTE — PATIENT INSTRUCTIONS
1. Right Thoracic RFA      Follow up: 4-6 weeks after procedure          To speak with a nurse, schedule/reschedule/cancel a clinic appointment, or request a medication refill call: [phone]     You can also reach us by [email or web address]: [email or web address]    For refills, please call on Monday, 1 week before your medication runs out. The doctors are not always in clinic, so this gives us time to get your prescriptions ready.  Please let us know the name of the medication you are requesting a refill of.

## 2019-09-24 NOTE — LETTER
9/24/2019       RE: Luca Jack  [address]  [place] [address]     Dear Colleague,    Thank you for referring your patient, Luca Jack, to the [facility] at [facility]. Please see a copy of my visit note below.    Chief Complaint:   Chief Complaint   Patient presents with     Pain Management     Follow up       History of present illness:  Luca Jack is a 53 year old male  with PAST MEDICAL HISTORY:  Hypertension, thoracic back pain (chronic)      Patient is here today for routine follow up.  He has been seen in this clinic for multilevel thoracic spondylosis with a strong myofascial component of his pain.  Patient was last seen on 8/1/2019.   At that time it was noted that his pain has been refractory to treatment with radiofrequency denervation of the thoracic facets (status post T9, T10, T11, T12 left radiofrequency ablation at [facility] on 6/17/2019 with 0% relief; S/p right RFA in 2/2019 in [place] with 0% relief).  Of note, he does state that he achieved 100% temporary relief of his pain after his medial branch blocks.    At the time of his last clinic visit he declined management modalities, including physical therapy and was started on Mobic and methocarbamol.    INTERVAL HISTORY:  Today he states that he took 1 dose of the Mobic and methocarbamol that resulted in a seizure.  He describes constant shaking for an entire night without loss of consciousness.  He was never seen for this episode.  He was however seen multiple times since his last clinic visit in the emergency department and in primary care clinics.  These are as follows:  - Patient was seen in the [facility] emergency department on 8/11/2019 for chronic bilateral thoracic back pain.  He was given Valium, NSAIDs, Lidoderm, ice packs for pain and was discharged home the same day.   - He presented to his primary care providers on 8/13/2019  "for his chronic back pain and was started on Cymbalta 30 mg/day. \"that made me feel like I wanted to kill myself\". He is no longer taking this.   - He was seen again in the emergency department at [facility] on 8/14/2019 for hypertension, diarrhea, chronic back pain.  He was diagnosed with likely viral gastroenteritis with incidental finding of high blood pressure.  - He was again seen in primary care, this time by Dr. Easton Hahn on 8/15/2019.  The plan at that time was for him to start another trial of physical therapy. He didn't continue with this because he says nothing has helped.   -Additionally he was seen at [facility] on 8/23/2019 at which time he had a repeat MRI of the thoracic spine.  Outside read from [facility] demonstrates chronic mild T5 and T6 superior endplate compressions.  No significant vertebral body height loss.  Normal alignment.  Normal marrow signal.  Mild T6/T7 through T11/T12 disc degeneration without large disc herniation.  He was noted to have normal facets and no spinal canal or neural from foraminal stenosis.  No abnormal cord signal.  Today he states that he was told at that visit that his pain is due to disc herniations.    Concerns: Today he is very concerned that he has had worsening of his chronic pain since his left thoracic RFA at [facility] in June 2019.  He states that he was taken off of narcotics at that time and has been unable to function since that time.  He states prior to his RF, while he was on narcotics, he was able to work out in the garage on his hot rods and go for walks.  He is no longer able to do either of these due to pain.  He characterizes the pain as follows:  Onset: 2003 while at work while leaning over  Location: between scapula   Character: sharp, stabbing pain  Timing: Constant  Aggravating: standing, walking  Alleviating: self traction  Associated symptoms: no numbness, tingling or true weakness  No gait instability, clumsiness.  No urinary or fecal " "incontinence, no urinary retention, no saddle anesthesia.    Previous treatments:   - Vicodin - helpful  - Oxycodone - helpful  Nonnarcotic treatments:  Land-based PT, water PT, acupuncture (1 session, not helpful), TENS unit (not at all helpful), pain psychology (screening sessions, not helpful) (he has not tried biofeedback, mindfulness).    Has been to [facility] in the past.    He is no longer working. Most recently worked in 2006. He is on disability for his back pain.     Medications include   Current Outpatient Medications   Medication     hydrochlorothiazide (HYDRODIURIL) 25 MG tablet     lisinopril (PRINIVIL/ZESTRIL) 20 MG tablet     meloxicam (MOBIC) 7.5 MG tablet     methocarbamol (ROBAXIN) 500 MG tablet     NARCAN 4 MG/0.1ML nasal spray     oxyCODONE IR (ROXICODONE) 10 MG tablet     No current facility-administered medications for this visit.    .   He is not currently taking oxycodone, methocarbamol, meloxicam      How often do you practice SELF-CARE (relaxing, stretching, pacing, monitoring posture, taking mini-breaks) in a typical day: Never     ROS: 10 point ROS neg other than the symptoms noted above in the HPI.    BP (!) 143/99   Pulse 106   Resp 16   Ht 1.676 m (5' 6\")   BMI 33.89 kg/m       Well developed  Well nourished  Overweight  Alert and oriented X3  Gait:   Normal  MSK: Normal range of motion in forward flexion at the waist and lumbar extension  No tenderness to palpation throughout thoracic paraspinal muscles and spinous processes  Neuro: Sensation is intact to light touch throughout upper and lower extremities  Strength is 5/5 in bilateral upper extremities in shoulder abduction, elbow flexion, wrist extension, elbow extension, finger flexion, finger abduction  Strength is 5/5 in bilateral hip flexion, knee extension.  Able to walk on heels and toes  Reflexes are 2+/4 in bilateral biceps, brachial radialis, triceps, patellar, ankle jerk  Franchesca's negative " bilaterally, no clonus at the ankles bilaterally    DIAGNOSTIC TESTS:  Imaging Studies: Outside MRI of the thoracic spine 8/23/2019 at [facility]  Read is:  Findings: Chronic mild T5 and T6 superior endplate compressions.  No significant vertebral body height loss.  Normal alignment.  Normal marrow signal.  Mild T6/7 through T11/12 disc degeneration.  No large disc herniation.  Normal facets.  No spinal canal or neural foraminal stenosis.  No abnormal cord signal.  No extraspinal abnormality.  Impression:  1.  No interval change.  2.  Mild interbody degeneration from T6/7 through T11/12  3.  No spinal canal or neuroforaminal stenosis.  4.  No abnormal cord signal or marrow edema.      ASSESSMENT:   1.  Chronic pain syndrome  2.  Chronic thoracic back pain  3. Thoracic spondylosis without myelopathy or radiculopathy    Barriers: Passive coping techniques    PLAN:    Diagnosis reviewed, treatment option addressed, and risk/benifits discussed.  Self-care instructions given.  I am recommending a multidisciplinary treatment plan to help this patient better manage her pain.    1.  Physical Therapy:  NO   patient declines further physical therapy, as he states it does not work  2.  Clinical Health Psychologist to address issues of relaxation, behavioral change, coping style, and other factors important to improvement.  NO.  Recommended this to patient, he declines  3.  Diagnostic Studies: None  4.  Medication Management: Trial Butrans patch 5 mcg/day  6.  Further procedures recommended: Repeat right thoracic RF neurotomies  7.  Acupuncture: NO  8.  TENs unit: NO       Follow up: Postprocedure      [facility]  Pain medicine follow    Patient seen and examined with Dr. Lawrence who agrees with the assessment and plan      I saw and examined the patient with the fellow and agree with the findings and the plan of care as documented in the fellow's note.   Zeke Lawrence IV, MD

## 2019-09-24 NOTE — PROGRESS NOTES
"Chief Complaint:   Chief Complaint   Patient presents with     Pain Management     Follow up       History of present illness:  Luca Jack is a 53 year old male  with PAST MEDICAL HISTORY:  Hypertension, thoracic back pain (chronic)      Patient is here today for routine follow up.  He has been seen in this clinic for multilevel thoracic spondylosis with a strong myofascial component of his pain.  Patient was last seen on 8/1/2019.   At that time it was noted that his pain has been refractory to treatment with radiofrequency denervation of the thoracic facets (status post T9, T10, T11, T12 left radiofrequency ablation at [facility] on 6/17/2019 with 0% relief; S/p right RFA in 2/2019 in [place] with 0% relief).  Of note, he does state that he achieved 100% temporary relief of his pain after his medial branch blocks.    At the time of his last clinic visit he declined management modalities, including physical therapy and was started on Mobic and methocarbamol.    INTERVAL HISTORY:  Today he states that he took 1 dose of the Mobic and methocarbamol that resulted in a seizure.  He describes constant shaking for an entire night without loss of consciousness.  He was never seen for this episode.  He was however seen multiple times since his last clinic visit in the emergency department and in primary care clinics.  These are as follows:  - Patient was seen in the [facility] emergency department on 8/11/2019 for chronic bilateral thoracic back pain.  He was given Valium, NSAIDs, Lidoderm, ice packs for pain and was discharged home the same day.   - He presented to his primary care providers on 8/13/2019 for his chronic back pain and was started on Cymbalta 30 mg/day. \"that made me feel like I wanted to kill myself\". He is no longer taking this.   - He was seen again in the emergency department at [facility] on 8/14/2019 for hypertension, diarrhea, chronic back pain.  He was diagnosed with likely viral gastroenteritis " with incidental finding of high blood pressure.  - He was again seen in primary care, this time by Dr. Easton Hahn on 8/15/2019.  The plan at that time was for him to start another trial of physical therapy. He didn't continue with this because he says nothing has helped.   -Additionally he was seen at [facility] on 8/23/2019 at which time he had a repeat MRI of the thoracic spine.  Outside read from [facility] demonstrates chronic mild T5 and T6 superior endplate compressions.  No significant vertebral body height loss.  Normal alignment.  Normal marrow signal.  Mild T6/T7 through T11/T12 disc degeneration without large disc herniation.  He was noted to have normal facets and no spinal canal or neural from foraminal stenosis.  No abnormal cord signal.  Today he states that he was told at that visit that his pain is due to disc herniations.    Concerns: Today he is very concerned that he has had worsening of his chronic pain since his left thoracic RFA at [facility] in June 2019.  He states that he was taken off of narcotics at that time and has been unable to function since that time.  He states prior to his RF, while he was on narcotics, he was able to work out in the garage on his hot rods and go for walks.  He is no longer able to do either of these due to pain.  He characterizes the pain as follows:  Onset: 2003 while at work while leaning over  Location: between scapula   Character: sharp, stabbing pain  Timing: Constant  Aggravating: standing, walking  Alleviating: self traction  Associated symptoms: no numbness, tingling or true weakness  No gait instability, clumsiness.  No urinary or fecal incontinence, no urinary retention, no saddle anesthesia.    Previous treatments:   - Vicodin - helpful  - Oxycodone - helpful  Nonnarcotic treatments:  Land-based PT, water PT, acupuncture (1 session, not helpful), TENS unit (not at all helpful), pain psychology (screening sessions, not helpful) (he has not tried  "biofeedback, mindfulness).    Has been to [facility] in the past.    He is no longer working. Most recently worked in 2006. He is on disability for his back pain.     Medications include   Current Outpatient Medications   Medication     hydrochlorothiazide (HYDRODIURIL) 25 MG tablet     lisinopril (PRINIVIL/ZESTRIL) 20 MG tablet     meloxicam (MOBIC) 7.5 MG tablet     methocarbamol (ROBAXIN) 500 MG tablet     NARCAN 4 MG/0.1ML nasal spray     oxyCODONE IR (ROXICODONE) 10 MG tablet     No current facility-administered medications for this visit.    .   He is not currently taking oxycodone, methocarbamol, meloxicam      How often do you practice SELF-CARE (relaxing, stretching, pacing, monitoring posture, taking mini-breaks) in a typical day: Never     ROS: 10 point ROS neg other than the symptoms noted above in the HPI.    BP (!) 143/99   Pulse 106   Resp 16   Ht 1.676 m (5' 6\")   BMI 33.89 kg/m      Well developed  Well nourished  Overweight  Alert and oriented X3  Gait:   Normal  MSK: Normal range of motion in forward flexion at the waist and lumbar extension  No tenderness to palpation throughout thoracic paraspinal muscles and spinous processes  Neuro: Sensation is intact to light touch throughout upper and lower extremities  Strength is 5/5 in bilateral upper extremities in shoulder abduction, elbow flexion, wrist extension, elbow extension, finger flexion, finger abduction  Strength is 5/5 in bilateral hip flexion, knee extension.  Able to walk on heels and toes  Reflexes are 2+/4 in bilateral biceps, brachial radialis, triceps, patellar, ankle jerk  Franchesca's negative bilaterally, no clonus at the ankles bilaterally    DIAGNOSTIC TESTS:  Imaging Studies: Outside MRI of the thoracic spine 8/23/2019 at [facility]  Read is:  Findings: Chronic mild T5 and T6 superior endplate compressions.  No significant vertebral body height loss.  Normal alignment.  Normal " marrow signal.  Mild T6/7 through T11/12 disc degeneration.  No large disc herniation.  Normal facets.  No spinal canal or neural foraminal stenosis.  No abnormal cord signal.  No extraspinal abnormality.  Impression:  1.  No interval change.  2.  Mild interbody degeneration from T6/7 through T11/12  3.  No spinal canal or neuroforaminal stenosis.  4.  No abnormal cord signal or marrow edema.      ASSESSMENT:   1.  Chronic pain syndrome  2.  Chronic thoracic back pain  3. Thoracic spondylosis without myelopathy or radiculopathy    Barriers: Passive coping techniques    PLAN:    Diagnosis reviewed, treatment option addressed, and risk/benifits discussed.  Self-care instructions given.  I am recommending a multidisciplinary treatment plan to help this patient better manage her pain.    1.  Physical Therapy:  NO   patient declines further physical therapy, as he states it does not work  2.  Clinical Health Psychologist to address issues of relaxation, behavioral change, coping style, and other factors important to improvement.  NO.  Recommended this to patient, he declines  3.  Diagnostic Studies: None  4.  Medication Management: Trial Butrans patch 5 mcg/day  6.  Further procedures recommended: Repeat right thoracic RF neurotomies  7.  Acupuncture: NO  8.  TENs unit: NO       Follow up: Postprocedure      [facility]  Pain medicine follow    Patient seen and examined with Dr. Lawrence who agrees with the assessment and plan      I saw and examined the patient with the fellow and agree with the findings and the plan of care as documented in the fellow's note.   Zeke Lawrence IV, MD

## 2019-09-25 ENCOUNTER — TELEPHONE (OUTPATIENT)
Dept: ANESTHESIOLOGY | Facility: CLINIC | Age: 54
End: 2019-09-25

## 2019-09-25 DIAGNOSIS — M54.6 CHRONIC MIDLINE THORACIC BACK PAIN: Primary | ICD-10-CM

## 2019-09-25 DIAGNOSIS — G89.29 CHRONIC MIDLINE THORACIC BACK PAIN: Primary | ICD-10-CM

## 2019-09-25 NOTE — TELEPHONE ENCOUNTER
[facility] Call Center    Phone Message    May a detailed message be left on voicemail: yes    Reason for Call: Medication Question or concern regarding medication   Prescription Clarification  Name of Medication: Request for pain med  Prescribing Provider: Dr. Zeke Lawrence   Pharmacy: [id number] - [place], [place] - [address]   What on the order needs clarification? Pt's mother Aliya calling, very upset that pt has not received pain medication. She notes pt was seen yesterday in clinic, and Dr. Lawrence said he would prescribe medication until pt could be scheduled for an RFA. Aliya states the RFA has not been scheduled, and their pharmacy has not heard from clinic. Aliya is very irritated as pt is in pain and they expected to get this medication-she did not know what it was called. Please advise.    Action Taken: Message routed to:  [facility] ([facility] Pain

## 2019-09-26 RX ORDER — CAPSAICIN 0.025 %
1 CREAM (GRAM) TOPICAL 4 TIMES DAILY
Qty: 60 G | Refills: 1 | Status: SHIPPED | OUTPATIENT
Start: 2019-09-26 | End: 2022-03-28

## 2019-09-26 NOTE — TELEPHONE ENCOUNTER
LPN called and spoke to Dr. Lawrence, regarding clarification on medications recommended.   Dr. Lawrence stated pt should continue with Mobic, Robaxin and can trial Capsacin cream.   Tx planning will include Repeating the RFA.   Butrans patches were not approved for the pt's insurance- Dr. Lawrence was not agreeable to prescribe anything else.     VM left for Pt's Mom, Aliya, to call the clinic back when available if she had additional questions.     LPN called the pt again- and the Pt answered.   Pt was informed of Dr. Lawrence's recommendations- and they were interested in trialing Capsaicin.    LPN reviewed the pre-procedure instructions with the pt- and was informed that they will place them in the mail to them.     Pt had no other questions for the LPN or the provider at this time.    Dari Lewis LPN

## 2019-09-26 NOTE — TELEPHONE ENCOUNTER
[facility] Call Center    Phone Message    May a detailed message be left on voicemail: yes    Reason for Call: Other: Aliya called in again very upset as no one has reached out to her regarding why the pain medication was cancelled for Luca. Please reach out to Aliya today as they are going out of town tomorrow and Luca needs his medication before they leave.     Action Taken: Message routed to:  [facility] Pain

## 2019-10-01 ENCOUNTER — TELEPHONE (OUTPATIENT)
Dept: ANESTHESIOLOGY | Facility: CLINIC | Age: 54
End: 2019-10-01

## 2019-10-01 NOTE — TELEPHONE ENCOUNTER
Attempted to reach out to patient to discuss scheduling RFA left detailed message that first available would be 10/29 with Dr. Lawrence. Left best call back number of [phone]

## 2019-12-23 ENCOUNTER — RECORDS - HEALTHEAST (OUTPATIENT)
Dept: ADMINISTRATIVE | Facility: OTHER | Age: 54
End: 2019-12-23

## 2020-02-24 ENCOUNTER — RECORDS - HEALTHEAST (OUTPATIENT)
Dept: ADMINISTRATIVE | Facility: OTHER | Age: 55
End: 2020-02-24

## 2020-03-16 ENCOUNTER — RECORDS - HEALTHEAST (OUTPATIENT)
Dept: ADMINISTRATIVE | Facility: OTHER | Age: 55
End: 2020-03-16

## 2020-04-25 ENCOUNTER — COMMUNICATION - HEALTHEAST (OUTPATIENT)
Dept: INTERNAL MEDICINE | Facility: CLINIC | Age: 55
End: 2020-04-25

## 2020-04-25 DIAGNOSIS — I10 HYPERTENSION: ICD-10-CM

## 2020-05-15 ENCOUNTER — RECORDS - HEALTHEAST (OUTPATIENT)
Dept: ADMINISTRATIVE | Facility: OTHER | Age: 55
End: 2020-05-15

## 2020-05-18 ENCOUNTER — AMBULATORY - HEALTHEAST (OUTPATIENT)
Dept: LAB | Facility: CLINIC | Age: 55
End: 2020-05-18

## 2020-05-18 ENCOUNTER — COMMUNICATION - HEALTHEAST (OUTPATIENT)
Dept: TELEHEALTH | Facility: CLINIC | Age: 55
End: 2020-05-18

## 2020-05-18 DIAGNOSIS — M46.89: ICD-10-CM

## 2020-05-18 DIAGNOSIS — G89.4 CHRONIC PAIN SYNDROME: ICD-10-CM

## 2020-05-18 DIAGNOSIS — F11.20 OPIOID TYPE DEPENDENCE, CONTINUOUS (H): ICD-10-CM

## 2020-05-18 DIAGNOSIS — F10.10 ALCOHOL ABUSE, CONTINUOUS: ICD-10-CM

## 2020-05-18 DIAGNOSIS — F10.21 ACUTE ALCOHOLIC INTOXICATION IN ALCOHOLISM, IN REMISSION (H): ICD-10-CM

## 2020-05-18 DIAGNOSIS — R46.89 NEGATIVISM: ICD-10-CM

## 2020-05-18 DIAGNOSIS — M25.50 PAIN IN JOINT, MULTIPLE SITES: ICD-10-CM

## 2020-05-18 DIAGNOSIS — M47.814 THORACIC SPONDYLOSIS WITHOUT MYELOPATHY: ICD-10-CM

## 2020-05-18 DIAGNOSIS — E66.9 OBESITY, UNSPECIFIED: ICD-10-CM

## 2020-05-18 DIAGNOSIS — M51.34 DEGENERATION OF THORACIC INTERVERTEBRAL DISC: ICD-10-CM

## 2020-05-18 DIAGNOSIS — M79.10 MYALGIA: ICD-10-CM

## 2020-05-18 DIAGNOSIS — M54.6 PAIN IN THORACIC SPINE: ICD-10-CM

## 2020-05-18 DIAGNOSIS — M62.830 BACK MUSCLE SPASM: ICD-10-CM

## 2020-05-18 DIAGNOSIS — F33.1 MAJOR DEPRESSIVE DISORDER, RECURRENT EPISODE, MODERATE (H): ICD-10-CM

## 2020-05-18 LAB
ALBUMIN SERPL-MCNC: 4.2 G/DL (ref 3.5–5)
ALP SERPL-CCNC: 53 U/L (ref 45–120)
ALT SERPL W P-5'-P-CCNC: 38 U/L (ref 0–45)
ANION GAP SERPL CALCULATED.3IONS-SCNC: 11 MMOL/L (ref 5–18)
AST SERPL W P-5'-P-CCNC: 29 U/L (ref 0–40)
BASOPHILS # BLD AUTO: 0 THOU/UL (ref 0–0.2)
BASOPHILS NFR BLD AUTO: 0 % (ref 0–2)
BILIRUB SERPL-MCNC: 0.4 MG/DL (ref 0–1)
BUN SERPL-MCNC: 17 MG/DL (ref 8–22)
C REACTIVE PROTEIN LHE: 0.8 MG/DL (ref 0–0.8)
CALCIUM SERPL-MCNC: 9.6 MG/DL (ref 8.5–10.5)
CHLORIDE BLD-SCNC: 103 MMOL/L (ref 98–107)
CO2 SERPL-SCNC: 26 MMOL/L (ref 22–31)
CREAT SERPL-MCNC: 1.14 MG/DL (ref 0.7–1.3)
EOSINOPHIL # BLD AUTO: 0.3 THOU/UL (ref 0–0.4)
EOSINOPHIL NFR BLD AUTO: 4 % (ref 0–6)
ERYTHROCYTE [DISTWIDTH] IN BLOOD BY AUTOMATED COUNT: 10.8 % (ref 11–14.5)
ERYTHROCYTE [SEDIMENTATION RATE] IN BLOOD BY WESTERGREN METHOD: 16 MM/HR (ref 0–15)
GFR SERPL CREATININE-BSD FRML MDRD: >60 ML/MIN/1.73M2
GLUCOSE BLD-MCNC: 115 MG/DL (ref 70–125)
HCT VFR BLD AUTO: 37.2 % (ref 40–54)
HGB BLD-MCNC: 12.7 G/DL (ref 14–18)
LYMPHOCYTES # BLD AUTO: 1.9 THOU/UL (ref 0.8–4.4)
LYMPHOCYTES NFR BLD AUTO: 27 % (ref 20–40)
MCH RBC QN AUTO: 30.9 PG (ref 27–34)
MCHC RBC AUTO-ENTMCNC: 34.1 G/DL (ref 32–36)
MCV RBC AUTO: 91 FL (ref 80–100)
MONOCYTES # BLD AUTO: 0.7 THOU/UL (ref 0–0.9)
MONOCYTES NFR BLD AUTO: 10 % (ref 2–10)
NEUTROPHILS # BLD AUTO: 4.2 THOU/UL (ref 2–7.7)
NEUTROPHILS NFR BLD AUTO: 59 % (ref 50–70)
PLATELET # BLD AUTO: 217 THOU/UL (ref 140–440)
PMV BLD AUTO: 9 FL (ref 7–10)
POTASSIUM BLD-SCNC: 4.1 MMOL/L (ref 3.5–5)
PROT SERPL-MCNC: 7 G/DL (ref 6–8)
RBC # BLD AUTO: 4.1 MILL/UL (ref 4.4–6.2)
RHEUMATOID FACT SERPL-ACNC: <15 IU/ML (ref 0–30)
SODIUM SERPL-SCNC: 140 MMOL/L (ref 136–145)
T3FREE SERPL-MCNC: 3.9 PG/ML (ref 1.9–3.9)
T4 FREE SERPL-MCNC: 0.8 NG/DL (ref 0.7–1.8)
TSH SERPL DL<=0.005 MIU/L-ACNC: 1.82 UIU/ML (ref 0.3–5)
VIT B12 SERPL-MCNC: 327 PG/ML (ref 213–816)
WBC: 7.1 THOU/UL (ref 4–11)

## 2020-05-19 LAB — 25(OH)D3 SERPL-MCNC: 12.8 NG/ML (ref 30–80)

## 2020-05-20 LAB — PYRIDOXAL PHOS SERPL-SCNC: 22.9 NMOL/L (ref 20–125)

## 2020-05-21 LAB — G6PD RBC-CCNT: 12.2 U/G HB (ref 9.9–16.6)

## 2020-06-04 ENCOUNTER — RECORDS - HEALTHEAST (OUTPATIENT)
Dept: ADMINISTRATIVE | Facility: OTHER | Age: 55
End: 2020-06-04

## 2020-08-04 ENCOUNTER — RECORDS - HEALTHEAST (OUTPATIENT)
Dept: ADMINISTRATIVE | Facility: OTHER | Age: 55
End: 2020-08-04

## 2020-08-23 ENCOUNTER — COMMUNICATION - HEALTHEAST (OUTPATIENT)
Dept: INTERNAL MEDICINE | Facility: CLINIC | Age: 55
End: 2020-08-23

## 2020-08-23 DIAGNOSIS — I10 HYPERTENSION: ICD-10-CM

## 2020-11-16 ENCOUNTER — COMMUNICATION - HEALTHEAST (OUTPATIENT)
Dept: SCHEDULING | Facility: CLINIC | Age: 55
End: 2020-11-16

## 2020-11-17 ENCOUNTER — COMMUNICATION - HEALTHEAST (OUTPATIENT)
Dept: SCHEDULING | Facility: CLINIC | Age: 55
End: 2020-11-17

## 2020-11-17 ENCOUNTER — AMBULATORY - HEALTHEAST (OUTPATIENT)
Dept: CARE COORDINATION | Facility: CLINIC | Age: 55
End: 2020-11-17

## 2020-11-17 DIAGNOSIS — U07.1 2019 NOVEL CORONAVIRUS DISEASE (COVID-19): ICD-10-CM

## 2020-11-18 ENCOUNTER — COMMUNICATION - HEALTHEAST (OUTPATIENT)
Dept: NURSING | Facility: CLINIC | Age: 55
End: 2020-11-18

## 2020-11-20 ENCOUNTER — COMMUNICATION - HEALTHEAST (OUTPATIENT)
Dept: INTERNAL MEDICINE | Facility: CLINIC | Age: 55
End: 2020-11-20

## 2020-11-20 DIAGNOSIS — I10 HYPERTENSION: ICD-10-CM

## 2020-12-14 ENCOUNTER — OFFICE VISIT - HEALTHEAST (OUTPATIENT)
Dept: INTERNAL MEDICINE | Facility: CLINIC | Age: 55
End: 2020-12-14

## 2020-12-14 DIAGNOSIS — Z12.5 ENCOUNTER FOR SCREENING FOR MALIGNANT NEOPLASM OF PROSTATE: ICD-10-CM

## 2020-12-14 DIAGNOSIS — I10 ESSENTIAL HYPERTENSION: ICD-10-CM

## 2020-12-14 DIAGNOSIS — Z00.00 ROUTINE GENERAL MEDICAL EXAMINATION AT A HEALTH CARE FACILITY: ICD-10-CM

## 2020-12-14 DIAGNOSIS — G89.29 OTHER CHRONIC PAIN: ICD-10-CM

## 2020-12-14 LAB
ALBUMIN SERPL-MCNC: 4.5 G/DL (ref 3.5–5)
ALP SERPL-CCNC: 48 U/L (ref 45–120)
ALT SERPL W P-5'-P-CCNC: 34 U/L (ref 0–45)
ANION GAP SERPL CALCULATED.3IONS-SCNC: 15 MMOL/L (ref 5–18)
AST SERPL W P-5'-P-CCNC: 30 U/L (ref 0–40)
BILIRUB SERPL-MCNC: 0.5 MG/DL (ref 0–1)
BUN SERPL-MCNC: 14 MG/DL (ref 8–22)
CALCIUM SERPL-MCNC: 9.4 MG/DL (ref 8.5–10.5)
CHLORIDE BLD-SCNC: 103 MMOL/L (ref 98–107)
CHOLEST SERPL-MCNC: 219 MG/DL
CO2 SERPL-SCNC: 22 MMOL/L (ref 22–31)
CREAT SERPL-MCNC: 1.04 MG/DL (ref 0.7–1.3)
FASTING STATUS PATIENT QL REPORTED: YES
GFR SERPL CREATININE-BSD FRML MDRD: >60 ML/MIN/1.73M2
GLUCOSE BLD-MCNC: 97 MG/DL (ref 70–125)
HDLC SERPL-MCNC: 41 MG/DL
LDLC SERPL CALC-MCNC: 144 MG/DL
POTASSIUM BLD-SCNC: 4.5 MMOL/L (ref 3.5–5)
PROT SERPL-MCNC: 7.2 G/DL (ref 6–8)
PSA SERPL-MCNC: 1.8 NG/ML (ref 0–3.5)
SODIUM SERPL-SCNC: 140 MMOL/L (ref 136–145)
TRIGL SERPL-MCNC: 169 MG/DL

## 2020-12-14 ASSESSMENT — MIFFLIN-ST. JEOR: SCORE: 1712.16

## 2020-12-15 LAB — HCV AB SERPL QL IA: NEGATIVE

## 2020-12-17 ENCOUNTER — COMMUNICATION - HEALTHEAST (OUTPATIENT)
Dept: INTERNAL MEDICINE | Facility: CLINIC | Age: 55
End: 2020-12-17

## 2021-02-15 ENCOUNTER — COMMUNICATION - HEALTHEAST (OUTPATIENT)
Dept: INTERNAL MEDICINE | Facility: CLINIC | Age: 56
End: 2021-02-15

## 2021-02-15 DIAGNOSIS — I10 HYPERTENSION: ICD-10-CM

## 2021-05-07 ENCOUNTER — RECORDS - HEALTHEAST (OUTPATIENT)
Dept: ADMINISTRATIVE | Facility: OTHER | Age: 56
End: 2021-05-07

## 2021-05-27 ENCOUNTER — RECORDS - HEALTHEAST (OUTPATIENT)
Dept: ADMINISTRATIVE | Facility: CLINIC | Age: 56
End: 2021-05-27

## 2021-05-27 NOTE — TELEPHONE ENCOUNTER
RN cannot approve Refill Request    RN can NOT refill this medication PCP messaged that patient is overdue for Labs and Office Visit.       Anne-Marie Gonzalez, [facility] Triage/Med Refill 4/8/2019    Requested Prescriptions   Pending Prescriptions Disp Refills     lisinopril (PRINIVIL,ZESTRIL) 20 MG tablet [Pharmacy Med Name: Lisinopril Oral Tablet 20 MG] 90 tablet 2     Sig: TAKE ONE TABLET BY MOUTH ONE TIME DAILY       Ace Inhibitors Refill Protocol Failed - 4/7/2019  1:16 PM        Failed - PCP or prescribing provider visit in past 12 months       Last office visit with prescriber/PCP: 4/14/2017 Jaime Mason MD OR same dept: Visit date not found OR same specialty: 4/14/2017 Jaime Mason MD  Last physical: 4/5/2018 Last MTM visit: Visit date not found   Next visit within 3 mo: Visit date not found  Next physical within 3 mo: Visit date not found  Prescriber OR PCP: Jaime Mason MD  Last diagnosis associated with med order: 1. Hypertension  - lisinopril (PRINIVIL,ZESTRIL) 20 MG tablet [Pharmacy Med Name: Lisinopril Oral Tablet 20 MG]; TAKE ONE TABLET BY MOUTH ONE TIME DAILY  Dispense: 90 tablet; Refill: 2  - hydroCHLOROthiazide (HYDRODIURIL) 25 MG tablet [Pharmacy Med Name: hydroCHLOROthiazide Oral Tablet 25 MG]; TAKE ONE TABLET BY MOUTH ONE TIME DAILY  Dispense: 90 tablet; Refill: 2    If protocol passes may refill for 12 months if within 3 months of last provider visit (or a total of 15 months).             Failed - Serum Potassium in past 12 months     No results found for: LN-POTASSIUM          Failed - Blood pressure filed in past 12 months     BP Readings from Last 1 Encounters:   04/05/18 134/88             Failed - Serum Creatinine in past 12 months     Creatinine   Date Value Ref Range Status   04/06/2018 0.99 0.70 - 1.30 mg/dL Final             hydroCHLOROthiazide (HYDRODIURIL) 25 MG tablet [Pharmacy Med Name: hydroCHLOROthiazide Oral Tablet 25 MG] 90 tablet 2     Sig: TAKE  ONE TABLET BY MOUTH ONE TIME DAILY       Diuretics/Combination Diuretics Refill Protocol  Failed - 4/7/2019  1:16 PM        Failed - Visit with PCP or prescribing provider visit in past 12 months     Last office visit with prescriber/PCP: 4/14/2017 Jaime Mason MD OR same dept: Visit date not found OR same specialty: 4/14/2017 Jaime Mason MD  Last physical: 4/5/2018 Last MTM visit: Visit date not found   Next visit within 3 mo: Visit date not found  Next physical within 3 mo: Visit date not found  Prescriber OR PCP: Jaime Mason MD  Last diagnosis associated with med order: 1. Hypertension  - lisinopril (PRINIVIL,ZESTRIL) 20 MG tablet [Pharmacy Med Name: Lisinopril Oral Tablet 20 MG]; TAKE ONE TABLET BY MOUTH ONE TIME DAILY  Dispense: 90 tablet; Refill: 2  - hydroCHLOROthiazide (HYDRODIURIL) 25 MG tablet [Pharmacy Med Name: hydroCHLOROthiazide Oral Tablet 25 MG]; TAKE ONE TABLET BY MOUTH ONE TIME DAILY  Dispense: 90 tablet; Refill: 2    If protocol passes may refill for 12 months if within 3 months of last provider visit (or a total of 15 months).             Failed - Serum Potassium in past 12 months      No results found for: LN-POTASSIUM          Failed - Serum Sodium in past 12 months      No results found for: LN-SODIUM          Failed - Blood pressure on file in past 12 months     BP Readings from Last 1 Encounters:   04/05/18 134/88             Failed - Serum Creatinine in past 12 months      Creatinine   Date Value Ref Range Status   04/06/2018 0.99 0.70 - 1.30 mg/dL Final

## 2021-05-28 NOTE — PROGRESS NOTES
Mel comes in today for follow-up of his chronic medical conditions.  He is currently on lisinopril and hydrochlorothiazide at 20 and 25 mg respectively for his hypertension.  Apparently he has been off of this for the last couple of days as he is not been able to get his medications.  Blood pressure is slightly elevated secondary to this.  He is feeling well today and a complete review of systems is undertaken and was negative.  As far as his chronic back pain is concerned he is going to be seeing the [facility] in the next couple of weeks for consideration of a medial branch block.    Objective: Vital signs are as per the EMR.  In general the patient is alert pleasant and in no acute distress.  He appears healthy.    Assessment and plan:    1.  Hypertension, uncontrolled.  This is secondary to him being off of his medications for a couple of days.  We will check a CMP today.  He is going to follow-up with a nurse only visit in 2 weeks for blood pressure recheck.

## 2021-05-29 ENCOUNTER — RECORDS - HEALTHEAST (OUTPATIENT)
Dept: ADMINISTRATIVE | Facility: CLINIC | Age: 56
End: 2021-05-29

## 2021-05-29 NOTE — PROGRESS NOTES
I met with Luca Jack at the request of Dr. Mason to recheck his blood pressure.  Blood pressure medications on the MAR were reviewed with patient.    Patient has taken all medications as per usual regimen: Yes  Patient reports tolerating them without any issues or concerns: Yes    Vitals:    05/24/19 1248   BP: 138/88   Patient Site: Right Arm   Patient Position: Sitting   Cuff Size: Adult Large       Blood pressure was taken, previous encounter was reviewed, recorded blood pressure below 140/90.  Patient was discharged and the note will be sent to the provider for final review.        Ashley Corona CMA  12:50 PM  5/24/2019

## 2021-05-30 VITALS — WEIGHT: 220 LBS | HEIGHT: 66 IN | BODY MASS INDEX: 35.36 KG/M2

## 2021-05-31 NOTE — TELEPHONE ENCOUNTER
Attempted to reach patient again but was not able to leave a voicemail. We have tried to reach patient 3 times but have not been able to get through. It does look like in the original message they let him know to increase his fiber to help with the diarrhea. This is the message we were also going to relay. Closing encounter.  Terrie Rodriges CMA ............... 4:55 PM, 08/20/19

## 2021-05-31 NOTE — PROGRESS NOTES
Office Visit - Follow Up   Luca Jack   53 y.o. male    Date of Visit: 8/15/2019    Chief Complaint   Patient presents with     Follow-up     Feeling improved - Lots of pain mid back        Assessment and Plan     1.  53-year-old man, having his first visit with me, comes for follow-up after emergency department visit yesterday August 14, and to seek a perspective regarding long-standing midthoracic back pain in the context of known degenerative disc disease and spondylolysis, for which he has failed at least 2 rounds of multilevel radiofrequency ablation (first round delivered in [place] in February 2019, then another round done at [facility] in June 2019).  Various oral medications have been unhelpful, including gabapentin, duloxetine, muscle relaxant such as methocarbamol, and Robaxin.  He had been on long-standing opioids, but noticed that they were losing effectiveness.  He told me that he is actually stopped taking oxycodone and OxyContin as of 1 week ago.  It has been a tough week, as his whole body tries to adapt to being opioid free.    When I examined him today, I note he has weak, underdeveloped interscapular and thoracic erector spinae muscles.    He told me he is determined to avoid opioids going forward.  We had a 30-minute plus discussion this afternoon about a long-term strategy to rehabilitate his back.  I told him that I do not think that further interventional procedures like RFA would offer any long-term solution.  In fact, I think that they may have made him weaker by taking away the neuroprotective signals in his spine.  As I examined him today, if he has a weak interscapular muscles and erector spinae muscles in the thoracic region.  He is overweight.    He is willing to give organized physical therapy another try.  It has been several years.  I am going to send him to our on-site PT providers, with a request to focus on strengthening his thoracic spine musculature,  improving posture, and building axial strength.  I would like him to try various physical measures like massage, heat, stretching.    Perhaps pain management approaches could be useful, such as mindfulness training, meditation, hypnosis, possibly acupuncture.  We can consider these down the road.  But most importantly, I want to get his physical therapy program going again.  He owns an inversion table at home, and I think that is okay for him to use.    I told him he also needs to sleep on a supportive high-quality mattress, with an adequate pillow.    Losing weight would also reduce stress on his spine, and I like to see him lose at least 30 pounds over the next 6 months.  His mom is an excellent cook, and she is here with him today, and she promises to make that happen.     2.  Diarrhea that was probably food poisoning, that is now resolved.  He keeps Lomotil on hand    3.  Hypertension, for which he is on lisinopril hydrochlorothiazide with good blood pressure noted today.    4.  Obesity with BMI of 33    5.  Mildly elevated lipase of 59 measured on August 14, which I do not think a significant.  His hepatic profile was normal.    6.  Mildly decreased hemoglobin of 12.4 when measured on August 14.  I find no evidence of an bleeding anywhere.  Sometime the next few months, we should recheck his CBC to make sure his counts normalized.    I like to see him back in 2 months.         History of Present Illness   This 53 y.o. old follow-up after ER visit yesterday 8-14-19 for diarrhea and midthoracic back pain.    He attributes the diarrhea to a bad meal at [employer], but there also might be a component from opioid withdrawal.  He has a long and complex history of midthoracic back pain from degenerative disc disease and spondylosis.  He decided to stop all opioids (OxyContin and immediate action oxycodone) about a week ago.  He became frustrated with all his oral medications, including gabapentin, duloxetine, and muscle  relaxants, none of which have been helpful.  He still keeps diazepam on hand for muscle spasms.    He underwent multilevel right mid-thoracic RFA done at a clinic in [place] February 14, 2019  Initially got relief.  When he returned in [place] and in June 2019 had multilevel left-sided RFA done at the .  He found that unhelpful.  Dr. Jaime Mason prescribed duloxetine, but he only took a dose or 2, and stopped it because it made his mood unstable. Mr Jack reported that gabapentin triggered nightmares and suicidal ideation    Since stopping the opioids, he told me that it has been a tough week, with worse pain and and all over body tingling that is gotten better only in the last 3 days.    When his diarrhea did not improve on Lomotil, he ended up in the emergency department with complaints of diarrhea, nausea, light-headedness, and weakness. He also had elevated blood pressure , a change from his baseline reading.  Also throbbing headache and very poor sleeping.  In the emergency department he was given IV fluids and his heart rate improved and blood pressure settled down to 153/84.  Blood work was unrevealing including conference of metabolic panel.  CBC had a mildly anemic hemoglobin in the mid 12, but perhaps that was hemodilution because he was being given IV fluids.    Long history of chronic low back pain, daily opioid use (previously-prescribed 20mg oxycontin two times a day).  In the emergency department, he reported being out of his Oxycontin x2 weeks.    Mr. Jack he told me that he used to be employed as an .  He has been on Social Security disability since age 45 because of back pain.    Review of Systems: A comprehensive review of systems was negative except as noted.     Medications, Allergies, Social, and Problem List   Current Outpatient Medications   Medication Sig Dispense Refill     diazePAM (VALIUM) 5 MG tablet Take 1 tablet (5 mg total) by mouth every 8 (eight)  hours as needed for anxiety or muscle spasms. 20 tablet 0     diphenoxylate-atropine (LOMOTIL) 2.5-0.025 mg per tablet Take 1 tablet by mouth 4 (four) times a day as needed for diarrhea. 30 tablet 0     lisinopril-hydrochlorothiazide (PRINZIDE,ZESTORETIC) 20-25 mg per tablet Take 1 tablet by mouth daily. 90 tablet 3     traZODone (DESYREL) 150 MG tablet Take 1 tablet (150 mg total) by mouth at bedtime. 90 tablet 3     No current facility-administered medications for this visit.      Allergies   Allergen Reactions     Gabapentin Other (See Comments)     Pt reports nightmares and suicidal ideations while taking Gabapentin x 3 days.       Social History     Tobacco Use     Smoking status: Never Smoker     Smokeless tobacco: Never Used   Substance Use Topics     Alcohol use: Yes     Comment: liquor seldom     Drug use: Not on file     Patient Active Problem List   Diagnosis     Lumbar Disc Degeneration     Opioid Dependence With Episodic Use     Chronic Pain     Hypertension     Spondylosis of thoracic region without myelopathy or radiculopathy        Reviewed, reconciled and updated       Physical Exam   General Appearance: Overweight but muscular man, who squirms and stretches in his chair trying to get comfortable.  He is here with his mom, who seems to be supportive.    /77 (Patient Site: Right Arm, Patient Position: Sitting, Cuff Size: Adult Large)   Pulse 92   Temp 98.3  F (36.8  C) (Oral)   Wt 210 lb 1.6 oz (95.3 kg)   SpO2 97%   BMI 33.91 kg/m      Posture notable for slouched shoulders  Underdeveloped interscapular muscles.  Upper extremity muscle strength and lower externally muscle strength normal.  Walks with stooped posture.  Mental status normal, seems to have intact insight and judgment     Additional Information   I spent 25 minutes face time with the patient, with > 50% counseling, explaining and discussing with the patient the issues enumerated in the Assessment and Plan section of this note  and answering questions. Afterwards, the patient was given a printout of the AVS, with attention to the content in the Patient Instruction section.       Easton Hahn MD

## 2021-05-31 NOTE — PROGRESS NOTES
[facility] Discharge Summary  Patient Name: Luca Jack  Date: 9/30/2019  Referral Diagnosis: Low Back Pain  Referring provider: Easton Hahn MD  Visit Diagnosis:   1. Spondylosis of thoracic region without myelopathy or radiculopathy     2. Weakness         Goals:  Pt. will be independent with home exercise program in : 6 weeks    Pt will: Able to sit 20 minutes with pain levels 3-4/10 within 8 weeks  Pt will: Able to walk 10 minutes without having to lean on a cart with pain levels 5/10 within 8 weeks  Pt will: Able to sleep 3-4 hours at a time with less medication and pain 5/10 within 8 weeks      Patient was seen for 1 visit on 8/27/19 with no follow-ups.   See note below.  Patient did not schedule any follow-ups.    Therapy will be discontinued at this time.  The patient will need a new referral to resume.    Thank you for your referral.  Juani KHAN Corby  9/30/2019  2:48 PM    [facility]   Lumbar/thoracic Initial Evaluation        [facility] Certification Request    August 27, 2019      Patient: Luca Jack  MR Number: [id number]  YOB: 1965  Date of Visit: 8/27/2019      Dear Dr. Hahn    Thank you for this referral.   We are seeing Luca Jack for Physical Therapy of thoracic pain.    Medicare and/or Medicaid requires physician review and approval of the treatment plan. Please review the plan of care and verify that you agree with the therapy plan of care by co-signing this note.      If you have any questions or concerns, please don't hesitate to call.    Sincerely,      Juani Tavarez, PT        Physician recommendation:     ___ Follow therapist's recommendation        ___ Modify therapy      *Physician co-signature indicates they certify the need for these services furnished within this plan and while under their care.        Patient Name: Luca Jack  Date of evaluation: 8/27/2019  Referral Diagnosis: Spondylosis  "of thoracic region without myelopathy or radiculopathy  Referring provider: Easton Hahn MD  Visit Diagnosis:     ICD-10-CM    1. Spondylosis of thoracic region without myelopathy or radiculopathy M47.814    2. Weakness R53.1        Assessment:       Patient presents with chronic midback pain that started in 2006 with an insidious onset.  He has had various treatments over the years and nothing seems to help and doesn't feel anyone listens to him about his pain levels.  He did have what sounded like lumbar traction in physical therapy years ago, felt it was helpful, got a home unit and doesn't feel it targets his sore region.  Felt like the therapist didn't listen and got him a lumbar unit not a thoracic unit. He may be interested in trying a cervical traction unit to see if that will target the area better but did not have time to initiate that treatment today.  He did not feel strengthening would be beneficial, but may be willing to try.  He demonstrated normal cervical and lumbar AROM, decrease left hip and midback weakness.  Functional limitations are described as occurring with: sleep, stand, sit, walk, lifting.  Feel patient would benefit from strengthening, and maybe more intense strengthening like MedX, but unsure if pt would be willing to attend the program consistently.  Where he liked \"thoracic traction\" suggested he look at the website [email or web address] and see if he feels that would help.  He does have an inversion table but doesn't feel it helps.    Unsure if patient will return as he did not seem optimistic with strengthening and cervical traction or other therapy options.      Goals:  Pt. will be independent with home exercise program in : 6 weeks    Pt will: Able to sit 20 minutes with pain levels 3-4/10 within 8 weeks  Pt will: Able to walk 10 minutes without having to lean on a cart with pain levels 5/10 within 8 weeks  Pt will: Able to sleep 3-4 hours at a time with less medication and " pain 5/10 within 8 weeks      Patient's expectations/goals are realistic.      Barriers to Learning or Achieving Goals:  Patient Active Problem List   Diagnosis     Lumbar Disc Degeneration     Opioid Dependence With Episodic Use     Chronic Pain     Hypertension     Spondylosis of thoracic region without myelopathy or radiculopathy       POC, goals and pathology of condition were reviewed with the patient.  Patient is in agreement with the POC.       Plan / Patient Instructions:        Plan of Care:   Authorization / Certification Start Date: 08/27/19  Authorization / Certification End Date: 10/27/19  Authorization / Certification Number of Visits: 10  Communication with: Referral Source  Patient Related Instruction: Nature of Condition;Treatment plan and rationale;Self Care instruction;Basis of treatment;Body mechanics;Posture  Times per Week: 1-2  Number of Weeks: 8  Number of Visits: 10  Therapeutic Exercise: ROM;Stretching;Strengthening  Neuromuscular Reeducation: kinesio tape;posture;core  Manual Therapy: soft tissue mobilization;joint mobilization;myofascial release  Modalities: TENS      Plan for next visit:   Cervical traction if pt still would like to try  Strengthening for midback  edu for posture and body mechanics    If pt does not schedule within 3-4 weeks pt's chart will be discharged.  Patient was in agreement with this and will think if he wants to try physical therapy.     Patient's mother, Aliya, was present today  Subjective:           History of Present Illness:    Luca is a 53 y.o. male who presents to therapy today with complaints of thoracic pain.  Patient has had pain in his thoracic region that started in August 2006 with an insidious onset.  Patient has a long-standing midthoracic back pain with degenerative disc disease and spondylolysis, for which he has failed at least 2 rounds of multilevel radiofrequency ablation (first round delivered in [place] in February 2019, then another  round done at [facility] in 2019).  He has tried various medications and none of it seems to have helped.  It has been a few years since he has had physical therapy and he didn't feel it helped at that time. He has also tried pool therapy and multiple injections, which have not been helpful.  He is willing to give physical therapy another try. Symptoms are constant and not improving.     Pain Ratin  Pain rating at best: 3  Pain rating at worst: 10  Pain description: burning, pain and sharp    Functional limitations are described as occurring with:   sleep, stand, sit, walk, lifting    Patient reports nothing has been helpful that he has tried for pain control.         Objective:      Note: Items left blank indicates the item was not performed or not indicated at the time of the evaluation.    Patient Outcome Measures :    Modified Oswestry Low Back Pain Disablity Questionnaire  in %: 70     Scores range from 0-100%, where a score of 0% represents minimal pain and maximal function. The minimal clinically important difference is a score reduction of 12%.    Examination  1. Spondylosis of thoracic region without myelopathy or radiculopathy     2. Weakness       Involved side: Bilateral   Right Side Dominant  Posture Observation:      General standing posture is fair.    Lumbar ROM:         Within Normal Limits unless noted   Denies pain with any of the motions  Date: 19     *Indicate scale AROM AROM AROM   Lumbar Flexion      Lumbar Extension       Right Left Right Left Right Left   Lumbar Sidebending         Lumbar Rotation         Thoracic Flexion      Thoracic Extension      Thoracic Sidebending         Thoracic Rotation           Cervical AROM: WNL, denies pain    Lower Extremity Strength:     Date: 19     LE strength/5 Right Left Right Left Right Left   Hip Flexion (L1-3) 5 5       Hip Extension (L5-S1)         Hip Abduction (L4-5) 5 5       Hip Adduction (L2-3) 5 5       Hip External  Rotation         Hip Internal Rotation         Knee Extension (L3-4) 5 4       Knee Flexion 5 4       Ankle Dorsiflexion (L4-5)         Great Toe Extension (L5)         Ankle Plantar flexion (S1)         Abdominals      Upper Trapezius                       4+              4  Middle Trapzius                         4                4-  Rhomboid                                  4+              4-    UE Strength:  4+ to 5/5 throughout for shoulder, elbow flexion and extension bilaterally      Sensation           Reflex Testing  Lumbar Dermatomes Right Left UE Reflexes Right Left   Iliac Crest and Groin (L1)   Biceps (C5-6)     Anterior Medial Thigh (L2)   Brachioradialis (C5-6)     Anterior Thigh, Medial Epicondyle Femur (L3)   Triceps (C7-8)     Lateral Thigh, Anterior Knee, Medial Leg/Malleolus (L4)   Franchesca s test     Lateral Leg, Dorsal Foot (L5)   LE Reflexes     Lateral Foot (S1)   Patellar (L3-4)     Posterior Leg (S2)   Achilles (S1-2)     Other:   Babinski Response       Palpation: pain over bilateral T3-6 spinous processes, thoracic paraspinals.  Squat: increase trunk flexion      Lumbar Special Tests:     Lumbar Special Tests Right Left SI Tests Right  Left   Quadrant test   SI Compression     Straight leg raise - - SI Distraction     Crossover response   POSH Test     Slump - - Sacral Thrust     Sit-up test  FADIR     Trunk extensor endurance test  Resisted Abduction     Prone instability test  Other:     Pubic shotgun  Other:       Cervical Compression and distraction are negative.    Treatment Today     TREATMENT MINUTES COMMENTS   Evaluation 30 Lumbar/thoracic   Self-care/ Home management     Manual therapy     Neuromuscular Re-education     Therapeutic Activity     Therapeutic Exercises 25 Edu on cervical and lumbar traction.  Wrote information down on [email or web address] as an option for him to try  Edu on strength options and POC   Gait training     Modality__________________                Total  55    Blank areas are intentional and mean the treatment did not include these items.       PT Evaluation Code: (Please list factors)  Patient History/Comorbidities:   Patient Active Problem List   Diagnosis     Lumbar Disc Degeneration     Opioid Dependence With Episodic Use     Chronic Pain     Hypertension     Spondylosis of thoracic region without myelopathy or radiculopathy     Examination: relief to palpation, decrease leg strength on left  Clinical Presentation: stable  Clinical Decision Making: low    Patient History/  Comorbidities Examination  (body structures and functions, activity limitations, and/or participation restrictions) Clinical Presentation Clinical Decision Making (Complexity)   No documented Comorbidities or personal factors 1-2 Elements Stable and/or uncomplicated Low   1-2 documented comorbidities or personal factor 3 Elements Evolving clinical presentation with changing characteristics Moderate   3-4 documented comorbidities or personal factors 4 or more Unstable and unpredictable High              Juani Tavarez, PT  8/27/2019  8:06 AM

## 2021-05-31 NOTE — TELEPHONE ENCOUNTER
"RN Triage:   Consent to communicate with mother in the chart.   Patient is having back pain and was seen yesterday in the clinic.    Cymbalta for pain and started it yesterday. He did not like the Cymbalta. Per mother it made him lightheaded and weak. \"He got deathly ill\"   Diarrhea is worse, per mother he has had 4 episodes today. Patient is \"very weak and shaking\". Patient reported to mother that he is very lightheaded. Mother stated the patient is nauseated. Patient is laying in the bed now. Mother will go to assess son, she stated she wants to take him into the ED because she is concerned about his weakness.   Jayda Simpson RN, BSN [facility] Triage Nurse      Reason for Disposition    MODERATE weakness from poor fluid intake with no improvement after 2 hours of rest and fluids    Drinking very little and dehydration suspected (e.g., no urine > 12 hours, very dry mouth, very lightheaded)    Protocols used: WEAKNESS (GENERALIZED) AND FATIGUE-A-OH      "

## 2021-05-31 NOTE — TELEPHONE ENCOUNTER
Medication Request  Medication name: Prescription for medication for diarrhea  Pharmacy Name and Location: [facility] pharmacy [place]  Reason for request: Patient thought Dr. Mason was sending a prescription for medication to stop diarrhea. Reviewing chart note patient was to increase fiber. Patient relayed Immodium had not worked. Please reach out to patient and advise.  When did you use medication last?:  N/A  Patient offered appointment:  Patient was seen yesterday.   Okay to leave a detailed message: Yes [phone]

## 2021-05-31 NOTE — TELEPHONE ENCOUNTER
Who is calling: Patients mother  Reason for Call:  States patient took the cymbalta and had gotten ill right after his second dose. Mother states patient was so weak and had the chills and trouble breathing and she took him to the ER today. They gave him two liters of fluids as he was so dehydrated.  Is home now and resting.    Mother states she needs a refill on the Valium and something for his diarrhea sent to the pharmacy today.    Okay to leave a detailed message: Yes

## 2021-05-31 NOTE — PROGRESS NOTES
ASSESSMENT and PLAN:    #1.  Acute on chronic back pain.  I am going to start him on Cymbalta 30 mg/day.  He is still going to try to get into the [place] pain center.  Follow-up in 1 month.    2.  Hypertension, controlled.    #3.  Loose stools.  He is going to increase the amount of fiber that he is taking in.  No red flag signs in regards to his loose stools.  He will call me back if it is not improved.    Problem List Items Addressed This Visit     Chronic Pain - Primary    Relevant Medications    DULoxetine (CYMBALTA) 30 MG capsule          There are no Patient Instructions on file for this visit.    Medications Discontinued During This Encounter   Medication Reason     oxyCODONE (OXYCONTIN) 20 MG 12 hr tablet      hydrOXYzine (VISTARIL) 25 MG capsule        No follow-ups on file.    CHIEF COMPLAINT:  Chief Complaint   Patient presents with     Follow-up     back pain - better since ED      Diarrhea     x2 weeks       HISTORY OF PRESENT ILLNESS:  Luca Jack is a 53 y.o. male  presenting to the clinic today for follow-up of an ER visit for acute on chronic back pain.  He was seen 2 days ago with a flare of his back pain that has been going on for about the last 2 weeks ever since he underwent a radiofrequency ablation at the [facility].  He has been given a prescription for Robaxin which has not helped much.  He was given a prescription for Valium in the emergency room and was told to follow-up with me.  He was not given any opiates.  He states that his pain is improving a bit but it is still interfering with his day-to-day activities.  We discussed nonopiate treatment options today.  He has had suicidal ideation with gabapentin but has not tried Cymbalta in the past.  He is also been having some loose stools over the last 2 weeks.  He is having approximately 3 loose stools per day since eating at [employer].  No blood in his stool or abdominal pain.  He has been using Imodium with little  success.    REVIEW OF SYSTEMS:   Pertinent positives noted in HPI, remainder of ROS is negative.    PFSH:      MEDICATIONS:  Current Outpatient Medications   Medication Sig Dispense Refill     diazePAM (VALIUM) 5 MG tablet Take 1 tablet (5 mg total) by mouth every 8 (eight) hours as needed for anxiety or muscle spasms. 10 tablet 0     lisinopril-hydrochlorothiazide (PRINZIDE,ZESTORETIC) 20-25 mg per tablet Take 1 tablet by mouth daily. 90 tablet 3     traZODone (DESYREL) 150 MG tablet Take 1 tablet (150 mg total) by mouth at bedtime. 90 tablet 3     DULoxetine (CYMBALTA) 30 MG capsule Take 1 capsule (30 mg total) by mouth daily. 30 capsule 2     methocarbamol (ROBAXIN) 500 MG tablet Take 500 mg by mouth 4 (four) times a day as needed.  1     NARCAN 4 mg/actuation nasal spray   1     No current facility-administered medications for this visit.        TOBACCO USE:  Social History     Tobacco Use   Smoking Status Never Smoker   Smokeless Tobacco Never Used       VITALS:  Vitals:    08/13/19 1518   BP: 118/68   Pulse: (!) 116   Weight: 208 lb (94.3 kg)     Wt Readings from Last 3 Encounters:   08/13/19 208 lb (94.3 kg)   08/11/19 (!) 225 lb (102.1 kg)   05/10/19 218 lb 7 oz (99.1 kg)         PHYSICAL EXAM:  Constitutional:  Reveals an alert, pleasant  male.   Vitals:  Per nursing notes.   Body mass index is 33.57 kg/m .     Neurologic: Normal

## 2021-05-31 NOTE — TELEPHONE ENCOUNTER
Left Message to call clinic, Please give message below:    You are to increase your fiber intake to see if that will help with diarrhea.

## 2021-05-31 NOTE — TELEPHONE ENCOUNTER
Attempted to call but was not able to leave voicemail. Will try back at a later time.    Please tell patient to increase fiber in his diet to help with diarrhea. If this dose not improve diarrhea, let us know.  Terrie Rodriges CMA ............... 3:06 PM, 08/19/19

## 2021-05-31 NOTE — PATIENT INSTRUCTIONS - HE
1.  53-year-old man, having his first visit with me, for my perspective regarding long-standing midthoracic back pain in the context of known degenerative disc disease and spondylolysis, for which he has failed at least 2 rounds of multilevel radiofrequency ablation (first round delivered in [place] in February 2019, then another round done at [facility] in June 2019).  Various oral medications have been unhelpful, including gabapentin, duloxetine, muscle relaxant such as methocarbamol, and Robaxin.  He had been on long-standing opioids, but noticed that they were losing effectiveness.  He told me that he is actually stopped taking oxycodone and OxyContin as of 1 week ago.  It has been a tough week, as his whole body tries to adapt to being opioid free.    He is determined to avoid opioids going forward.  We had a 30-minute plus discussion this afternoon about a long-term strategy to rehabilitate his back.  I told him that I do not think that further interventional procedures like RFA would offer any sort of a long-term solution.  In fact, I think that they may have made him weaker by taking away the neuroprotective signals in his spine.  As I examined him today, if he has a weak interscapular muscles and erector spinae muscles in the thoracic region.  He is overweight.    He is willing to give organized physical therapy another try.  It has been several years.  I am going to send him to our on-site PT providers, with a request to focus on strengthening his thoracic spine musculature, improving posture, and building axial strength.  I would like him to try various physical measures like massage, heat, may be will even consider acupuncture.    Perhaps pain management approaches could be useful, such as mindfulness training, meditation, hypnosis.  We can consider these down the road.  But most importantly, I want to get his physical therapy program going again.  He owns an inversion table at home, and I  think that is okay for him to use.    I told him he also needs to sleep on a supportive high-quality mattress, with an adequate pillow.    Losing weight would also reduce stress on his spine, and I like to see him lose at least 30 pounds over the next 6 months.  His mom is an excellent cook, and she is here with him today, and she promises to make that happen.     2.  Diarrhea that was probably food poisoning, that is now resolved.  He keeps Lomotil on hand    3.  Hypertension, for which he is on lisinopril hydrochlorothiazide with good blood pressure noted today.    4.  Obesity with BMI of 33    5.  Mildly elevated lipase of 59 measured on August 14, which I do not think a significant.  His hepatic profile was normal.    6.  Mildly decreased hemoglobin of 12.4 when measured on August 14.  I find no evidence of an bleeding anywhere.  Sometime the next few months, we should recheck his CBC to make sure his counts normalized.    I like to see him back in 2 months.

## 2021-06-01 VITALS — HEIGHT: 66 IN | BODY MASS INDEX: 35.36 KG/M2 | WEIGHT: 220 LBS

## 2021-06-02 ENCOUNTER — RECORDS - HEALTHEAST (OUTPATIENT)
Dept: ADMINISTRATIVE | Facility: CLINIC | Age: 56
End: 2021-06-02

## 2021-06-03 VITALS — WEIGHT: 208 LBS | BODY MASS INDEX: 33.57 KG/M2

## 2021-06-03 VITALS — BODY MASS INDEX: 33.91 KG/M2 | WEIGHT: 210.1 LBS

## 2021-06-03 VITALS — WEIGHT: 218.44 LBS | BODY MASS INDEX: 35.26 KG/M2

## 2021-06-05 VITALS
BODY MASS INDEX: 33.11 KG/M2 | WEIGHT: 206 LBS | SYSTOLIC BLOOD PRESSURE: 100 MMHG | DIASTOLIC BLOOD PRESSURE: 72 MMHG | HEART RATE: 84 BPM | HEIGHT: 66 IN

## 2021-06-07 NOTE — TELEPHONE ENCOUNTER
Refill Approved    Rx renewed per Medication Renewal Policy. Medication was last renewed on 5/10/19.    Anne-Marie Gonzalez, [facility] Connection Triage/Med Refill 4/27/2020     Requested Prescriptions   Pending Prescriptions Disp Refills     lisinopriL-hydrochlorothiazide (PRINZIDE,ZESTORETIC) 20-25 mg per tablet [Pharmacy Med Name: Lisinopril-hydroCHLOROthiazide Oral Tablet 20-25 MG] 90 tablet 0     Sig: Take 1 tablet by mouth daily.       Diuretics/Combination Diuretics Refill Protocol  Passed - 4/25/2020  7:01 AM        Passed - Visit with PCP or prescribing provider visit in past 12 months     Last office visit with prescriber/PCP: 8/13/2019 Jaime Mason MD OR same dept: 8/15/2019 Easton Hahn MD OR same specialty: 8/15/2019 Easton Hahn MD  Last physical: 4/5/2018 Last MTM visit: Visit date not found   Next visit within 3 mo: Visit date not found  Next physical within 3 mo: Visit date not found  Prescriber OR PCP: Jaime Mason MD  Last diagnosis associated with med order: 1. Hypertension  - lisinopriL-hydrochlorothiazide (PRINZIDE,ZESTORETIC) 20-25 mg per tablet [Pharmacy Med Name: Lisinopril-hydroCHLOROthiazide Oral Tablet 20-25 MG]; Take 1 tablet by mouth daily.  Dispense: 90 tablet; Refill: 0  - traZODone (DESYREL) 150 MG tablet [Pharmacy Med Name: traZODone HCl Oral Tablet 150 MG]; Take 1 tablet (150 mg total) by mouth at bedtime.  Dispense: 90 tablet; Refill: 0    If protocol passes may refill for 12 months if within 3 months of last provider visit (or a total of 15 months).             Passed - Serum Potassium in past 12 months      Lab Results   Component Value Date    Potassium 4.0 08/14/2019             Passed - Serum Sodium in past 12 months      Lab Results   Component Value Date    Sodium 139 08/14/2019             Passed - Blood pressure on file in past 12 months     BP Readings from Last 1 Encounters:   08/15/19 114/77             Passed - Serum Creatinine in past 12 months       Creatinine   Date Value Ref Range Status   08/14/2019 1.17 0.70 - 1.30 mg/dL Final                traZODone (DESYREL) 150 MG tablet [Pharmacy Med Name: traZODone HCl Oral Tablet 150 MG] 90 tablet 0     Sig: Take 1 tablet (150 mg total) by mouth at bedtime.       Tricyclics/Misc Antidepressant/Antianxiety Meds Refill Protocol Passed - 4/25/2020  7:01 AM        Passed - PCP or prescribing provider visit in last year     Last office visit with prescriber/PCP: 8/13/2019 Jaime Mason MD OR same dept: 8/15/2019 Easton Hahn MD OR same specialty: 8/15/2019 Easton Hahn MD  Last physical: 4/5/2018 Last MTM visit: Visit date not found   Next visit within 3 mo: Visit date not found  Next physical within 3 mo: Visit date not found  Prescriber OR PCP: Jaime Mason MD  Last diagnosis associated with med order: 1. Hypertension  - lisinopriL-hydrochlorothiazide (PRINZIDE,ZESTORETIC) 20-25 mg per tablet [Pharmacy Med Name: Lisinopril-hydroCHLOROthiazide Oral Tablet 20-25 MG]; Take 1 tablet by mouth daily.  Dispense: 90 tablet; Refill: 0  - traZODone (DESYREL) 150 MG tablet [Pharmacy Med Name: traZODone HCl Oral Tablet 150 MG]; Take 1 tablet (150 mg total) by mouth at bedtime.  Dispense: 90 tablet; Refill: 0    If protocol passes may refill for 12 months if within 3 months of last provider visit (or a total of 15 months).

## 2021-06-10 NOTE — PROGRESS NOTES
ASSESSMENT:    Intermittent shortness of breath at rest.  Interestingly this improves generally when he is up walking around.  It appears to be very atypical for heart disease.  EKG showed normal sinus rhythm with no ST or T-wave changes.    PLAN:  Check a CBC, CMP, CRP, sed rate, and TSH.  I will call him with results and further recommendations.  Problem List Items Addressed This Visit     None      Visit Diagnoses     Shortness of breath    -  Primary    Relevant Orders    Electrocardiogram Perform and Read (Completed)    HM2(CBC w/o Differential)    Comprehensive Metabolic Panel    C-Reactive Protein(CRP)    Sedimentation Rate    Thyroid Stimulating Hormone (TSH)    Essential hypertension with goal blood pressure less than 140/90         Relevant Orders    Thyroid Stimulating Hormone (TSH)          Medications Discontinued During This Encounter   Medication Reason     OXYCONTIN 20 mg 12 hr tablet Duplicate order       No Follow-up on file.    There are no Patient Instructions on file for this visit.    CHIEF COMPLAINT:  Chief Complaint   Patient presents with     Hypertension     bp check       HISTORY OF PRESENT ILLNESS:  Luca Jack is a 51 y.o. male  presenting to the clinic today for shortness of breath. Symptoms started weeks to months ago per patient. He describes episodes that occur while he is at rest, frequently at 4-5 AM in the morning or midday. These occur 2-3 times per week. When he starts to experience shortness of breath, he gets up and walks around outside to make him forget about it. He has also tried Afrin nasal spray or breath right strips which somewhat help improve his air movement. He denies wheezing and chest pain with these episodes. He admits that he has put on some weight recently. He denies blood in urine or stool. He will have labs drawn today as ordered.     REVIEW OF SYSTEMS:   He had one brief episode of sharp chest pain a couple days ago, resolved spontaneously. All other  "systems are negative.    PFSH:  Paternal history of heart attack after symptoms of SOB. He and his mom recently went on a trip to [place], they went on 2-3 walks per day.     No Known Allergies    TOBACCO USE:  History   Smoking Status     Never Smoker   Smokeless Tobacco     Never Used       VITALS:  Vitals:    04/14/17 1348   BP: 132/90   Pulse: 84   Weight: 220 lb (99.8 kg)   Height: 5' 5.5\" (1.664 m)     Wt Readings from Last 3 Encounters:   04/14/17 220 lb (99.8 kg)   06/06/16 211 lb 6.4 oz (95.9 kg)   06/12/15 204 lb (92.5 kg)         PHYSICAL EXAM:  Constitutional:  Reveals an alert and oriented x3, pleasant male with no acute distress.   CV: S1, S2, regular rate and rhythm, no murmurs, gallops, or rubs   LUNGS: Clear to auscultation bilaterally.    EKG: NSR, no ST/T wave changes    ADDITIONAL HISTORY SUMMARIZED (FROM OLD RECORDS OR HISTORY FROM SOMEONE OTHER THAN THE PATIENT OR ANOTHER HEALTHCARE PROVIDER), COLONOSCOPY (2 TOTAL):  None.    DECISION TO OBTAIN EXTRA INFORMATION (OLD RECORDS REQUESTED OR HISTORY FROM ANOTHER PERSON OR ACCESSING CARE EVERYWHERE) (1 TOTAL): None.    RADIOLOGY TESTS SUMMARIZED OR ORDERED (XRAY/CT/MRI/DXA/MAMMO) (1 TOTAL): None.    LABS REVIEWED OR ORDERED (1 TOTAL): Labs ordered.    MEDICINE TESTS SUMMARIZED OR ORDERED (EKG/ECHO/EGD) (1 TOTAL): EKG ordered.     INDEPENDENT REVIEW OF EKG OR X-RAY (2 EACH): EKG interpreted as above.    The visit lasted a total of 8 minutes face to face with the patient. Over 50% of the time was spent counseling and educating the patient about SOB.    IDestini, am scribing for and in the presence of, Dr. Jaime Mason.    I, Dr. Jaime Mason, personally performed the services described in this documentation, as scribed by Destini Nova in my presence, and it is both accurate and complete.      MEDICATIONS:  Current Outpatient Prescriptions   Medication Sig Dispense Refill     hydrochlorothiazide (HYDRODIURIL) 25 MG tablet Take 1 tablet (25 " mg total) by mouth daily. 90 tablet 2     lisinopril (PRINIVIL,ZESTRIL) 20 MG tablet Take 1 tablet (20 mg total) by mouth daily. 90 tablet 2     oxyCODONE (OXYCONTIN) 20 MG 12 hr tablet Take 20 mg by mouth 3 (three) times a day.       Oxycodone HCl 10 mg Tab Take 20 mg by mouth every 6 (six) hours as needed.       oxyCODONE-acetaminophen (PERCOCET)  mg per tablet Take 1 tablet by mouth 2 times a day at 6:00 am and 4:00 pm.       hydrOXYzine (VISTARIL) 25 MG capsule Take 25 mg by mouth 3 (three) times a day as needed for itching.       No current facility-administered medications for this visit.        Total data points: 4

## 2021-06-10 NOTE — TELEPHONE ENCOUNTER
RN cannot approve Refill Request    RN can NOT refill this medication Protocol failed and NO refill given. Last office visit: 8/13/2019 Jaime Mason MD Last Physical: 4/5/2018 Last MTM visit: Visit date not found Last visit same specialty: 8/15/2019 Easton Hahn MD.  Next visit within 3 mo: Visit date not found  Next physical within 3 mo: Visit date not found      Anne-Marie Gonzalez, [facility] [place] Connection Triage/Med Refill 8/25/2020    Requested Prescriptions   Pending Prescriptions Disp Refills     lisinopriL-hydrochlorothiazide (PRINZIDE,ZESTORETIC) 20-25 mg per tablet [Pharmacy Med Name: Lisinopril-hydroCHLOROthiazide Oral Tablet 20-25 MG] 90 tablet 0     Sig: Take 1 tablet by mouth daily.       Diuretics/Combination Diuretics Refill Protocol  Failed - 8/23/2020  5:33 PM        Failed - Visit with PCP or prescribing provider visit in past 12 months     Last office visit with prescriber/PCP: 8/13/2019 Jaime Mason MD OR same dept: Visit date not found OR same specialty: 8/15/2019 Easton Hahn MD  Last physical: 4/5/2018 Last MTM visit: Visit date not found   Next visit within 3 mo: Visit date not found  Next physical within 3 mo: Visit date not found  Prescriber OR PCP: Jaime Mason MD  Last diagnosis associated with med order: 1. Hypertension  - lisinopriL-hydrochlorothiazide (PRINZIDE,ZESTORETIC) 20-25 mg per tablet [Pharmacy Med Name: Lisinopril-hydroCHLOROthiazide Oral Tablet 20-25 MG]; Take 1 tablet by mouth daily.  Dispense: 90 tablet; Refill: 0    If protocol passes may refill for 12 months if within 3 months of last provider visit (or a total of 15 months).             Failed - Blood pressure on file in past 12 months     BP Readings from Last 1 Encounters:   08/15/19 114/77             Passed - Serum Potassium in past 12 months      Lab Results   Component Value Date    Potassium 4.1 05/18/2020             Passed - Serum Sodium in past 12 months      Lab Results   Component  Value Date    Sodium 140 05/18/2020             Passed - Serum Creatinine in past 12 months      Creatinine   Date Value Ref Range Status   05/18/2020 1.14 0.70 - 1.30 mg/dL Final

## 2021-06-10 NOTE — TELEPHONE ENCOUNTER
Patient scheduled for physical 10/16/2020. Please give short term fill.  Terrie Rodriges CMA ............... 1:30 PM, 08/26/20

## 2021-06-13 NOTE — TELEPHONE ENCOUNTER
"Coronavirus (COVID-19) Notification    Caller Name (Patient, parent, daughter/son, grandparent, etc)  Patient - Luca Jack    Reason for call  Notify of Positive Coronavirus (COVID-19) lab results, assess symptoms,  review  [facility] recommendations    Lab Result    Lab test:  2019-nCoV rRt-PCR or SARS-CoV-2 PCR    Oropharyngeal AND/OR nasopharyngeal swabs is POSITIVE for 2019-nCoV RNA/SARS-COV-2 PCR (COVID-19 virus)    RN Recommendations/Instructions per [facility] Coronavirus COVID-19 recommendations    Brief introduction script  Introduce self and then review script:  \"I am calling on behalf of [facility].  We were notified that your Coronavirus test (COVID-19) for was POSITIVE for the virus.  I have some information to relay to you but first I wanted to mention that the [place] Dept of Health will be contacting you shortly [it's possible MD already called Patient] to talk to you more about how you are feeling and other people you have had contact with who might now also have the virus.  Also,  [facility] is Partnering with the [facility] for Covid-19 research, you may be contacted directly by research staff.\"    ssessment (Inquire about Patient's current symptoms)   Assessment   Current Symptoms at time of phone call: (if no symptoms, document No symptoms] Fatigue - other symptoms resolved   Symptom onset (if applicable) Mon, 11/2     If at time of call, Patients symptoms hare worsened, the Patient should contact 911 or have someone drive them to Emergency Dept promptly:      If Patient calling 911, inform 911 personal that you have tested positive for the Coronavirus (COVID-19).  Place mask on and await 911 to arrive.    If Emergency Dept, If possible, please have another adult drive you to the Emergency Dept but you need to wear mask when in contact with other people.      Review information with Patient    Your result was positive. This means you have COVID-19 (coronavirus). "  We have sent you a letter that reviews the information that I'll be reviewing with you now.    How can I protect others?    If you have symptoms: stay home and away from others (self-isolate) until:    You've had no fever--and no medicine that reduces fever--for 1 full day (24 hours). And      Your other symptoms have gotten better. For example, your cough or breathing has improved. And     At least 10 days have passed since your symptoms started. (If you ve been told by a doctor that you have a weak immune system, wait 20 days.)     If you don't have symptoms: Stay home and away from others (self-isolate) until at least 10 days have passed since your first positive COVID-19 test. (Date test collected).    During this time:    Stay in your own room, including for meals. Use your own bathroom if you can.    Stay away from others in your home. No hugging, kissing or shaking hands. No visitors.     Don't go to work, school or anywhere else.     Clean  high touch  surfaces often (doorknobs, counters, handles, etc.). Use a household cleaning spray or wipes. You'll find a full list on the EPA website at www.epa.gov/pesticide-registration/list-n-disinfectants-use-against-sars-cov-2.     Cover your mouth and nose with a mask, tissue or other face covering to avoid spreading germs.    Wash your hands and face often with soap and water.    Caregivers in these groups are at risk for severe illness due to COVID-19:  o People 65 years and older  o People who live in a nursing home or long-term care facility  o People with chronic disease (lung, heart, cancer, diabetes, kidney, liver, immunologic)  o People who have a weakened immune system, including those who:  - Are in cancer treatment  - Take medicine that weakens the immune system, such as corticosteroids  - Had a bone marrow or organ transplant  - Have an immune deficiency  - Have poorly controlled HIV or AIDS  - Are obese (body mass index of 40 or higher)  - Smoke  regularly    Caregivers should wear gloves while washing dishes, handling laundry and cleaning bedrooms and bathrooms.    Wash and dry laundry with special caution. Don't shake dirty laundry, and use the warmest water setting you can.    If you have a weakened immune system, ask your doctor about other actions you should take.    For more tips, go to www.cdc.gov/coronavirus/2019-ncov/downloads/10Things.pdf.    You should not go back to work until you meet the guidelines above for ending your home isolation. You don't need to be retested for COVID-19 before going back to work--studies show that you won't spread the virus if it's been at least 10 days since your symptoms started (or 20 days, if you have a weak immune system).    Employers: This document serves as formal notice of your employee's medical guidelines for going back to work. They must meet the above guidelines before going back to work in person.    How can I take care of myself?    1. Get lots of rest. Drink extra fluids (unless a doctor has told you not to).    2. Take Tylenol (acetaminophen) for fever or pain. If you have liver or kidney problems, ask your family doctor if it's okay to take Tylenol.     Take either:     650 mg (two 325 mg pills) every 4 to 6 hours, or     1,000 mg (two 500 mg pills) every 8 hours as needed.     Note: Don't take more than 3,000 mg in one day. Acetaminophen is found in many medicines (both prescribed and over-the-counter medicines). Read all labels to be sure you don't take too much.    For children, check the Tylenol bottle for the right dose (based on their age or weight).    3. If you have other health problems (like cancer, heart failure, an organ transplant or severe kidney disease): Call your specialty clinic if you don't feel better in the next 2 days.    4. Know when to call 911: Emergency warning signs include:    Trouble breathing or shortness of breath    Pain or pressure in the chest that doesn't go  away    Feeling confused like you haven't felt before, or not being able to wake up    Bluish-colored lips or face    5. Sign up for [employer]. We know it's scary to hear that you have COVID-19. We want to track your symptoms to make sure you're okay over the next 2 weeks. Please look for an email from [employer]--this is a free, online program that we'll use to keep in touch. To sign up, follow the link in the email. Learn more at [email or web address].    Where can I get more information?    [facility]: [email or web address]    Coronavirus Basics: www.[email or web address].[email or web address].[place]./diseases/coronavirus/basics.html    What to Do If You're Sick: www.cdc.gov/coronavirus/2019-ncov/about/steps-when-sick.html    Ending Home Isolation: www.cdc.gov/coronavirus/2019-ncov/hcp/disposition-in-home-patients.html     Caring for Someone with COVID-19: www.cdc.gov/coronavirus/2019-ncov/if-you-are-sick/care-for-someone.html     [facility] clinical trials (COVID-19 research studies): [email or web address]     A Positive COVID-19 letter will be sent via [employer] or the Mail.  (Exception, no letters sent to Presurgerical/Preprocedure Patients)    [Name]  Mary Ellen Gonzalez RN  [facility] Nurse Advisor

## 2021-06-13 NOTE — PROGRESS NOTES
Assessment and Plan:     1. Chronic Pain    Well controlled on Suboxone.  Continue    2. Hypertension    Will stop his hydrochlorothiazide and continue his lisinopril.  F/U with us in 2 weeks  - Comprehensive Metabolic Panel    3. Routine general medical examination at a health care facility    - Lipid Cascade  - PSA, Annual Screen (Prostatic-Specific Antigen)  - Hepatitis C Antibody (Anti-HCV)    He declines all vaccinations today.  He declined colon cancer screening today.    4. Encounter for screening for malignant neoplasm of prostate     - PSA, Annual Screen (Prostatic-Specific Antigen)     The patient's current medical problems were reviewed.      The following health maintenance schedule was reviewed with the patient and provided in printed form in the after visit summary:   Health Maintenance   Topic Date Due     DEPRESSION ACTION PLAN  1965     HEPATITIS C SCREENING  1965     HIV SCREENING  12/21/1980     TD 18+ HE  12/21/1983     TDAP ADULT ONE TIME DOSE  12/21/1983     COLORECTAL CANCER SCREENING  12/21/1983     ZOSTER VACCINES (1 of 2) 12/21/2015     MEDICARE ANNUAL WELLNESS VISIT  04/05/2019     INFLUENZA VACCINE RULE BASED (1) 10/04/2021 (Originally 8/1/2020)     ADVANCE CARE PLANNING  04/05/2023     LIPID  04/06/2023     Pneumococcal Vaccine: Pediatrics (0 to 5 Years) and At-Risk Patients (6 to 64 Years)  Aged Out     HEPATITIS B VACCINES  Aged Out        Subjective:   Chief Complaint: Luca Jack is an 54 y.o. male here for an Annual Wellness visit.   HPI: Pat comes in today for his yearly physical.  He is feeling well today and has no acute complaints.  He was diagnosed with COVID-19 1 month ago and states that he has lingering fatigue, but denies any shortness of breath or persistent cough.    History of hypertension, on both lisinopril and hydrochlorothiazide.  He is actually hypotensive today and he is having some dizziness with position changes.  I explained that we will stop  one of his blood pressure medications but he will need to continue on the other.    History of chronic back pain.  He is currently seeing [facility] and likes the treatment there.  He is on Suboxone which is working well for his pain.    Review of Systems:    Please see above.  The rest of the review of systems are negative for all systems.    Patient Care Team:  Jaime Mason MD as PCP - General (Internal Medicine)  Jaime Mason MD as Assigned PCP     Patient Active Problem List   Diagnosis     Lumbar Disc Degeneration     Opioid Dependence With Episodic Use     Chronic Pain     Hypertension     Spondylosis of thoracic region without myelopathy or radiculopathy     History reviewed. No pertinent past medical history.   Past Surgical History:   Procedure Laterality Date     ORIF RADIUS & ULNA FRACTURES Left 1989      History reviewed. No pertinent family history.   Social History     Socioeconomic History     Marital status: Single     Spouse name: Not on file     Number of children: Not on file     Years of education: Not on file     Highest education level: Not on file   Occupational History     Not on file   Social Needs     Financial resource strain: Not on file     Food insecurity     Worry: Not on file     Inability: Not on file     Transportation needs     Medical: Not on file     Non-medical: Not on file   Tobacco Use     Smoking status: Never Smoker     Smokeless tobacco: Never Used   Substance and Sexual Activity     Alcohol use: Yes     Comment: liquor seldom     Drug use: Not on file     Sexual activity: Not on file   Lifestyle     Physical activity     Days per week: Not on file     Minutes per session: Not on file     Stress: Not on file   Relationships     Social connections     Talks on phone: Not on file     Gets together: Not on file     Attends Congregation service: Not on file     Active member of club or organization: Not on file     Attends meetings of clubs or  "organizations: Not on file     Relationship status: Not on file     Intimate partner violence     Fear of current or ex partner: Not on file     Emotionally abused: Not on file     Physically abused: Not on file     Forced sexual activity: Not on file   Other Topics Concern     Not on file   Social History Narrative     Not on file      Current Outpatient Medications   Medication Sig Dispense Refill     buprenorphine-naloxone (SUBOXONE SL FILM) 2-0.5 mg Film per sublingual film Take 1 film Sublingual twice daily       lisinopriL-hydrochlorothiazide (PRINZIDE,ZESTORETIC) 20-25 mg per tablet Take 1 tablet by mouth daily.   90 tablet 0     traZODone (DESYREL) 150 MG tablet Take 1 tablet (150 mg total) by mouth at bedtime. 90 tablet 0     diazePAM (VALIUM) 5 MG tablet Take 1 tablet (5 mg total) by mouth every 8 (eight) hours as needed for anxiety or muscle spasms. 20 tablet 0     No current facility-administered medications for this visit.       Objective:   Vital Signs:   Visit Vitals  /72   Pulse 84   Ht 5' 6\" (1.676 m)   Wt 206 lb (93.4 kg)   BMI 33.25 kg/m           VisionScreening:  No exam data present     PHYSICAL EXAM  OBJECTIVE:    In general the patient is alert pleasant and in no acute distress.    HEENT: Pupils equal round reactive light.  Oropharynx is clear.  Lymphatic shows no anterior posterior cervical lymphadenopathy.  No thyromegaly or other masses noted.    Cardiovascular: S1-S2 regular in rhythm no murmurs gallops rubs    Lungs are clear    Abdomen is soft nontender nondistended.  No HSM.    Extremities show no pedal edema present bilaterally.  DP pulses are 2+ and normal bilaterally.    Skin exam shows no concerning skin lesions.    Assessment Results 12/14/2020   Activities of Daily Living No help needed   Instrumental Activities of Daily Living No help needed   Mini Cog Total Score 5   Some recent data might be hidden     A Mini-Cog score of 0-2 suggests the possibility of dementia, score " of 3-5 suggests no dementia    Identified Health Risks:     He is at risk for lack of exercise and has been provided with information to increase physical activity for the benefit of his well-being.  The patient was counseled and encouraged to consider modifying their diet and eating habits. He was provided with information on recommended healthy diet options.  Information regarding advance directives (living donato), including where he can download the appropriate form, was provided to the patient via the AVS.

## 2021-06-13 NOTE — PROGRESS NOTES
Community Health Worker called and left a message for the patient.  If the patient is returning my call, please transfer the patient to [facility] at ext. [phone].   Patient has been mailed a unreachable letter and was provided with CHW contact information if they are interested in accessing Clinic Care Coordination.  Order for Care Management has been closed, no further outreach will be done at this time and patient can be re-referred.         Clinic Care Coordination Contact  [facility]/Voicemail       Clinical Data: Care Coordinator Outreach  Outreach attempted x 1.  Left message on patient's voicemail with call back information and requested return call.   Care Coordinator will try to reach patient again in 1-2 business days.    Patient has AWV on 12/14

## 2021-06-13 NOTE — TELEPHONE ENCOUNTER
RN cannot approve Refill Request    RN can NOT refill this medication PCP messaged that patient is overdue for Office Visit. Last office visit: 8/13/2019 Jaime Mason MD Last Physical: 4/5/2018 Last MTM visit: Visit date not found Last visit same specialty: 8/15/2019 Easton Hahn MD.  Next visit within 3 mo: Visit date not found  Next physical within 3 mo: Visit date not found      Radha Ramirez, [facility] Connection Triage/Med Refill 11/21/2020    Requested Prescriptions   Pending Prescriptions Disp Refills     lisinopriL-hydrochlorothiazide (PRINZIDE,ZESTORETIC) 20-25 mg per tablet [Pharmacy Med Name: Lisinopril-hydroCHLOROthiazide Oral Tablet 20-25 MG] 90 tablet 0     Sig: Take 1 tablet by mouth daily.       Diuretics/Combination Diuretics Refill Protocol  Failed - 11/20/2020  7:37 PM        Failed - Visit with PCP or prescribing provider visit in past 12 months     Last office visit with prescriber/PCP: 8/13/2019 Jaime Mason MD OR same dept: Visit date not found OR same specialty: 8/15/2019 Easton Hahn MD  Last physical: 4/5/2018 Last MTM visit: Visit date not found   Next visit within 3 mo: Visit date not found  Next physical within 3 mo: Visit date not found  Prescriber OR PCP: Jaime Mason MD  Last diagnosis associated with med order: 1. Hypertension  - lisinopriL-hydrochlorothiazide (PRINZIDE,ZESTORETIC) 20-25 mg per tablet [Pharmacy Med Name: Lisinopril-hydroCHLOROthiazide Oral Tablet 20-25 MG]; Take 1 tablet by mouth daily.   Dispense: 90 tablet; Refill: 0    If protocol passes may refill for 12 months if within 3 months of last provider visit (or a total of 15 months).             Passed - Serum Potassium in past 12 months      Lab Results   Component Value Date    Potassium 4.1 11/14/2020             Passed - Serum Sodium in past 12 months      Lab Results   Component Value Date    Sodium 138 11/14/2020             Passed - Blood pressure on file in past 12  months     BP Readings from Last 1 Encounters:   11/14/20 135/71             Passed - Serum Creatinine in past 12 months      Creatinine   Date Value Ref Range Status   11/14/2020 1.57 (H) 0.70 - 1.30 mg/dL Final

## 2021-06-15 NOTE — TELEPHONE ENCOUNTER
Refill Approved    Rx renewed per Medication Renewal Policy. Medication was last renewed on 11/23/20.    Deniz Valenzuela, [facility] Connection Triage/Med Refill 2/16/2021     Requested Prescriptions   Pending Prescriptions Disp Refills     lisinopriL-hydrochlorothiazide (PRINZIDE,ZESTORETIC) 20-25 mg per tablet [Pharmacy Med Name: Lisinopril-hydroCHLOROthiazide Oral Tablet 20-25 MG] 90 tablet 0     Sig: Take 1 tablet by mouth daily.       Diuretics/Combination Diuretics Refill Protocol  Passed - 2/15/2021  6:24 PM        Passed - Visit with PCP or prescribing provider visit in past 12 months     Last office visit with prescriber/PCP: 8/13/2019 Jaime Mason MD OR same dept: Visit date not found OR same specialty: 8/15/2019 Easton Hahn MD  Last physical: 12/14/2020 Last MTM visit: Visit date not found   Next visit within 3 mo: Visit date not found  Next physical within 3 mo: Visit date not found  Prescriber OR PCP: Jaime Mason MD  Last diagnosis associated with med order: 1. Hypertension  - lisinopriL-hydrochlorothiazide (PRINZIDE,ZESTORETIC) 20-25 mg per tablet [Pharmacy Med Name: Lisinopril-hydroCHLOROthiazide Oral Tablet 20-25 MG]; Take 1 tablet by mouth daily.    Dispense: 90 tablet; Refill: 0    If protocol passes may refill for 12 months if within 3 months of last provider visit (or a total of 15 months).             Passed - Serum Potassium in past 12 months      Lab Results   Component Value Date    Potassium 4.5 12/14/2020             Passed - Serum Sodium in past 12 months      Lab Results   Component Value Date    Sodium 140 12/14/2020             Passed - Blood pressure on file in past 12 months     BP Readings from Last 1 Encounters:   12/14/20 100/72             Passed - Serum Creatinine in past 12 months      Creatinine   Date Value Ref Range Status   12/14/2020 1.04 0.70 - 1.30 mg/dL Final

## 2021-06-16 PROBLEM — M47.814 SPONDYLOSIS OF THORACIC REGION WITHOUT MYELOPATHY OR RADICULOPATHY: Status: ACTIVE | Noted: 2019-08-13

## 2021-06-17 NOTE — PROGRESS NOTES
Assessment and Plan:   AWV    1. Screening for colon cancer      2. Routine general medical examination at a health care facility    - Comprehensive Metabolic Panel; Future  - Lipid Cascade; Future    3. Hypertension      4. Encounter for screening for malignant neoplasm of prostate     - PSA, Annual Screen (Prostatic-Specific Antigen); Future     5) Chronic back pain.  Currently in between pain clinics.  Pt was at [facility] pain clinic but left as they ceased prescribing him opiates because of aggressive behavior and not bringing his pills to each visit.  Of note, he did not ask me to prescribe opiates for him today.    The patient's current medical problems were reviewed.    The following health maintenance schedule was reviewed with the patient and provided in printed form in the after visit summary:   Health Maintenance   Topic Date Due     TD 18+ HE  12/21/1983     TDAP ADULT ONE TIME DOSE  12/21/1983     COLONOSCOPY  12/21/2015     INFLUENZA VACCINE RULE BASED (1) 10/04/2021 (Originally 8/1/2017)     ADVANCE DIRECTIVES DISCUSSED WITH PATIENT  04/05/2023        Subjective:   Chief Complaint: Luca Jack is an 52 y.o. male here for an Annual Wellness visit.   HPI: Pat comes in today for his annual wellness visit.  He is due for some labs regarding his hypertension.  He is currently on hydrochlorothiazide and lisinopril.  He is tolerating these well.  He is in the market for a new pain clinic as his old pain clinic ceased prescribing him opioids for reasons noted above.  He is otherwise well.    Review of Systems:    Please see above.  The rest of the review of systems are negative for all systems.    Patient Care Team:  Jaime Mason MD as PCP - General (Internal Medicine)     Patient Active Problem List   Diagnosis     Thoracic Strain     Lumbar Disc Degeneration     Thoracic Disc Degeneration     Opioid Dependence With Episodic Use     Chronic Pain     Hypertension     No past medical  "history on file.   Past Surgical History:   Procedure Laterality Date     ORIF RADIUS & ULNA FRACTURES Left 1989      History reviewed. No pertinent family history.   Social History     Social History     Marital status: Single     Spouse name: N/A     Number of children: N/A     Years of education: N/A     Occupational History     Not on file.     Social History Main Topics     Smoking status: Never Smoker     Smokeless tobacco: Never Used     Alcohol use Yes      Comment: liquor seldom     Drug use: Not on file     Sexual activity: Not on file     Other Topics Concern     Not on file     Social History Narrative      Current Outpatient Prescriptions   Medication Sig Dispense Refill     hydroCHLOROthiazide (HYDRODIURIL) 25 MG tablet TAKE ONE TABLET BY MOUTH ONE TIME DAILY  90 tablet 0     hydrOXYzine (VISTARIL) 25 MG capsule Take 25 mg by mouth 3 (three) times a day as needed for itching.       lisinopril (PRINIVIL,ZESTRIL) 20 MG tablet TAKE ONE TABLET BY MOUTH ONE TIME DAILY  90 tablet 0     traZODone (DESYREL) 150 MG tablet Take 150 mg by mouth at bedtime.  0     oxyCODONE (OXYCONTIN) 20 MG 12 hr tablet Take 20 mg by mouth 3 (three) times a day.       Oxycodone HCl 10 mg Tab Take 20 mg by mouth every 6 (six) hours as needed.       oxyCODONE-acetaminophen (PERCOCET)  mg per tablet Take 1 tablet by mouth 2 times a day at 6:00 am and 4:00 pm.       No current facility-administered medications for this visit.       Objective:   Vital Signs:   Visit Vitals     /88     Pulse 88     Ht 5' 6\" (1.676 m)     Wt 220 lb (99.8 kg)     BMI 35.51 kg/m2        VisionScreening:  No exam data present     PHYSICAL EXAM  OBJECTIVE:    In general the patient is alert pleasant and in no acute distress.    HEENT: Pupils equal round reactive light.  Oropharynx is clear.  Lymphatic shows no anterior posterior cervical lymphadenopathy.  No thyromegaly or other masses noted.    Cardiovascular: S1-S2 regular in rhythm no murmurs " gallops rubs    Lungs are clear    Abdomen is soft nontender nondistended.  No HSM.    Extremities show no pedal edema present bilaterally.  DP pulses are 2+ and normal bilaterally.    Skin exam shows no concerning skin lesions.    Assessment Results 4/5/2018   Activities of Daily Living No help needed   Instrumental Activities of Daily Living No help needed   Mini Cog Total Score 4   Some recent data might be hidden     A Mini-Cog score of 0-2 suggests the possibility of dementia, score of 3-5 suggests no dementia    Identified Health Risks:     He is at risk for lack of exercise and has been provided with information to increase physical activity for the benefit of his well-being.  The patient was counseled and encouraged to consider modifying their diet and eating habits. He was provided with information on recommended healthy diet options.  The patient's MARLI-7 score is consistent with probably anxiety disorder.  He was provided with patient information regarding anxiety.  Information regarding advance directives (living donato), including where he can download the appropriate form, was provided to the patient via the AVS.

## 2021-06-18 NOTE — PATIENT INSTRUCTIONS - HE
Patient Instructions by Jaime Mason MD at 12/14/2020 11:20 AM     Author: Jaime Mason MD Service: -- Author Type: Physician    Filed: 12/14/2020  1:15 PM Encounter Date: 12/14/2020 Status: Signed    : Jaime Mason MD (Physician)         Patient Education     Exercise for a Healthier Heart  You may wonder how you can improve the health of your heart. If youre thinking about exercise, youre on the right track. You dont need to become an athlete, but you do need a certain amount of brisk exercise to help strengthen your heart. If you have been diagnosed with a heart condition, your doctor may recommend exercise to help stabilize your condition. To help make exercise a habit, choose safe, fun activities.       Be sure to check with your health care provider before starting an exercise program.    Why exercise?  Exercising regularly offers many healthy rewards. It can help you do all of the following:    Improve your blood cholesterol levels to help prevent further heart trouble    Lower your blood pressure to help prevent a stroke or heart attack    Control diabetes, or reduce your risk of getting this disease    Improve your heart and lung function    Reach and maintain a healthy weight    Make your muscles stronger and more limber so you can stay active    Prevent falls and fractures by slowing the loss of bone mass (osteoporosis)    Manage stress better  Exercise tips  Ease into your routine. Set small goals. Then build on them.  Exercise on most days. Aim for a total of 150 or more minutes of moderate to  vigorous intensity activity each week. Consider 40 minutes, 3 to 4 times a week. For best results, activity should last for 40 minutes on average. It is OK to work up to the 40 minute period over time. Examples of moderate-intensity activity is walking one mile in 15 minutes or 30 to 45 minutes of yard work.  Step up your daily activity level. Along with your exercise program,  try being more active throughout the day. Walk instead of drive. Do more household tasks or yard work.  Choose one or more activities you enjoy. Walking is one of the easiest things you can do. You can also try swimming, riding a bike, or taking an exercise class.  Stop exercising and call your doctor if you:    Have chest pain or feel dizzy or lightheaded    Feel burning, tightness, pressure, or heaviness in your chest, neck, shoulders, back, or arms    Have unusual shortness of breath    Have increased joint or muscle pain    Have palpitations or an irregular heartbeat      4333-0456 [employer] [address] [place] [address]. All rights reserved. This information is not intended as a substitute for professional medical care. Always follow your healthcare professional's instructions.         Patient Education   Understanding [employer] MyPlate  The USDA (US Department of Agriculture) has guidelines to help you make healthy food choices. These are called MyPlate. MyPlate shows the food groups that make up healthy meals using the image of a place setting. Before you eat, think about the healthiest choices for what to put onto your plate or into your cup or bowl. To learn more about building a healthy plate, visit www.choosemyplate.gov.       The Food Groups    Fruits: Any fruit or 100% fruit juice counts as part of the Fruit Group. Fruits may be fresh, canned, frozen, or dried, and may be whole, cut-up, or pureed. Make half your plate fruits and vegetables.    Vegetables: Any vegetable or 100% vegetable juice counts as a member of the Vegetable Group. Vegetables may be fresh, frozen, canned, or dried. They can be served raw or cooked and may be whole, cut-up, or mashed. Make half your plate fruits and vegetables.     Grains: All foods made from grains are part of the Grains Group. These include wheat, rice, oats, cornmeal, and barley such as bread, pasta, oatmeal, cereal, tortillas, and grits.  Grains should be no more than a quarter of your plate. At least half of your grains should be whole grains.    Protein: This group includes meat, poultry, seafood, beans and peas, eggs, processed soy products (like tofu), nuts (including nut butters), and seeds. Make protein choices no more than a quarter of your plate. Meat and poultry choices should be lean or low fat.    Dairy: All fluid milk products and foods made from milk that contain calcium, like yogurt and cheese are part of the Dairy Group. (Foods that have little calcium, such as cream, butter, and cream cheese, are not part of the group.) Most dairy choices should be low-fat or fat-free.    Oils: These are fats that are liquid at room temperature. They include canola, corn, olive, soybean, and sunflower oil. Foods that are mainly oil include mayonnaise, certain salad dressings, and soft margarines. You should have only 5 to 7 teaspoons of oils a day. You probably already get this much from the food you eat.  Use VetDC to Help Build Your Meals  The [employer] can help you plan and track your meals and activity. You can look up individual foods to see or compare their nutritional value. You can get guidelines for what and how much you should eat. You can compare your food choices. And you can assess personal physical activities and see ways you can improve. Go to www.JoinMe@.gov/supertracker/.    2563-2044 [employer]. [address] [place] [address], [place] [address]. All rights reserved. This information is not intended as a substitute for professional medical care. Always follow your healthcare professional's instructions.           Patient Education   Understanding Advance Care Planning  Advance care planning is the process of deciding ones own future medical care. It helps ensure that if you cant speak for yourself, your wishes can still be carried out. The plan is a series of legal documents that note a persons wishes. The  documents vary by state. Advance care planning may be done when a person has a serious illness that is expected to get worse. It may be done before major surgery. And it can help you and your family be prepared in case of a major illness or injury. Advance care planning helps with making decisions at these times.       A health care proxy is a person who acts as the voice of a patient when the patient cant speak for himself or herself. The name of this role varies by state. It may be called a Durable Medical Power of  or Durable Power of  for Healthcare. It may be called an agent, surrogate, or advocate. Or it may be called a representative or decision maker. It is an official duty that is identified by a legal document. The document also varies by state.    Why Is Advance Care Planning Important?  If a person communicates their healthcare wishes:    They will be given medical care that matches their values and goals.    Their family members will not be forced to make decisions in a crisis with no guidance.  Creating a Plan  Making an advance care plan is often done in 3 steps:    Thinking about ones wishes. To create an advance care plan, you should think about what kind of medical treatment you would want if you lose the ability to communicate. Are there any situations in which you would refuse or stop treatment? Are there therapies you would want or not want? And whom do you want to make decisions for you? There are many places to learn more about how to plan for your care. Ask your doctor or  for resources.    Picking a health care proxy. This means choosing a trusted person to speak for you only when you cant speak for yourself. When you cannot make medical decisions, your proxy makes sure the instructions in your advance care plan are followed. A proxy does not make decisions based on his or her own opinions. They must put aside those opinions and values if needed, and carry out  your wishes.    Filling out the legal documents. There are several kinds of legal documents for advance care planning. Each one tells health care providers your wishes. The documents may vary by state. They must be signed and may need to be witnessed or notarized. You can cancel or change them whenever you wish. Depending on your state, the documents may include a Healthcare Proxy form, Living Will, Durable Medical Power of , Advance Directive, or others.  The Familys Role  The best help a family can give is to support their loved ones wishes. Open and honest communication is vital. Family should express any concerns they have about the patients choices while the patient can still make decisions.    7034-8881 [employer] [address] [place] [address], [place] [address]. All rights reserved. This information is not intended as a substitute for professional medical care. Always follow your healthcare professional's instructions.         Also, [facility] offers a free, downloadable health care directive that allows you to share your treatment choices and personal preferences if you cannot communicate your wishes. It also allows you to appoint another person (called a health care agent) to make health care decisions if you are unable to do so. You can download an advance directive by going here: [email or web address]     Patient Education   Personalized Prevention Plan  You are due for the preventive services outlined below.  Your care team is available to assist you in scheduling these services.  If you have already completed any of these items, please share that information with your care team to update in your medical record.  Health Maintenance   Topic Date Due   ? DEPRESSION ACTION PLAN  1965   ? HEPATITIS C SCREENING  1965   ? HIV SCREENING  12/21/1980   ? TD 18+ HE  12/21/1983   ? TDAP ADULT ONE TIME DOSE  12/21/1983   ? COLORECTAL CANCER  SCREENING  12/21/1983   ? ZOSTER VACCINES (1 of 2) 12/21/2015   ? INFLUENZA VACCINE RULE BASED (1) 10/04/2021 (Originally 8/1/2020)   ? MEDICARE ANNUAL WELLNESS VISIT  12/14/2021   ? ADVANCE CARE PLANNING  04/05/2023   ? LIPID  04/06/2023   ? Pneumococcal Vaccine: Pediatrics (0 to 5 Years) and At-Risk Patients (6 to 64 Years)  Aged Out   ? HEPATITIS B VACCINES  Aged Out

## 2021-06-19 NOTE — LETTER
Letter by Jaime Mason MD at      Author: Jaime Mason MD Service: -- Author Type: --    Filed:  Encounter Date: 5/13/2019 Status: (Other)         Luca Jack  [address] [place] [address]             May 13, 2019         Dear Mr. Jack,    Below are the results from your recent visit:    Resulted Orders   Comprehensive Metabolic Panel   Result Value Ref Range    Sodium 140 136 - 145 mmol/L    Potassium 4.2 3.5 - 5.0 mmol/L    Chloride 105 98 - 107 mmol/L    CO2 24 22 - 31 mmol/L    Anion Gap, Calculation 11 5 - 18 mmol/L    Glucose 119 70 - 125 mg/dL    BUN 19 8 - 22 mg/dL    Creatinine 1.08 0.70 - 1.30 mg/dL    GFR MDRD Af Amer >60 >60 mL/min/1.73m2    GFR MDRD Non Af Amer >60 >60 mL/min/1.73m2    Bilirubin, Total 0.3 0.0 - 1.0 mg/dL    Calcium 10.0 8.5 - 10.5 mg/dL    Protein, Total 7.0 6.0 - 8.0 g/dL    Albumin 4.2 3.5 - 5.0 g/dL    Alkaline Phosphatase 52 45 - 120 U/L    AST 22 0 - 40 U/L    ALT 29 0 - 45 U/L    Narrative    Fasting Glucose reference range is 70-99 mg/dL per  American Diabetes Association (ADA) guidelines.       Labs look good, Pat    Please call with questions or contact us using [employer].    Sincerely,        Electronically signed by Jaime Mason MD

## 2021-06-21 NOTE — LETTER
Letter by Dari Coronado CHW at      Author: Dari Coronado CHW Service: -- Author Type: --    Filed:  Encounter Date: 11/18/2020 Status: (Other)       CARE COORDINATION  [facility]  [address] [place] [address]    November 20, 2020    Luca Jakc  [address]  [place] [address]      Dear Luca,    I am a clinic community health worker who works with Jaime Mason MD at [facility]. I have been trying to reach you recently to introduce Clinic Care Coordination and to see if there was anything I could assist you with.  Below is a description of clinic care coordination and how I can further assist you.      The clinic care coordination team is made up of a registered nurse,  and community health worker who understand the health care system. The goal of clinic care coordination is to help you manage your health and improve access to the health care system in the most efficient manner. The team can assist you in meeting your health care goals by providing education, coordinating services, strengthening the communication among your providers and supporting you with any resource needs.    Please feel free to contact me at [phone] with any questions or concerns. We are focused on providing you with the highest-quality healthcare experience possible and that all starts with you.     Sincerely,     Marichuy KHAN  Community Health Worker

## 2021-06-21 NOTE — LETTER
Letter by Jaime Mason MD at      Author: Jaime Mason MD Service: -- Author Type: --    Filed:  Encounter Date: 12/17/2020 Status: (Other)         Luca Jack  [address] [place] [address]             December 17, 2020         Dear Mr. Jack,    Below are the results from your recent visit:    Resulted Orders   Comprehensive Metabolic Panel   Result Value Ref Range    Sodium 140 136 - 145 mmol/L    Potassium 4.5 3.5 - 5.0 mmol/L    Chloride 103 98 - 107 mmol/L    CO2 22 22 - 31 mmol/L    Anion Gap, Calculation 15 5 - 18 mmol/L    Glucose 97 70 - 125 mg/dL    BUN 14 8 - 22 mg/dL    Creatinine 1.04 0.70 - 1.30 mg/dL    GFR MDRD Af Amer >60 >60 mL/min/1.73m2    GFR MDRD Non Af Amer >60 >60 mL/min/1.73m2    Bilirubin, Total 0.5 0.0 - 1.0 mg/dL    Calcium 9.4 8.5 - 10.5 mg/dL    Protein, Total 7.2 6.0 - 8.0 g/dL    Albumin 4.5 3.5 - 5.0 g/dL    Alkaline Phosphatase 48 45 - 120 U/L    AST 30 0 - 40 U/L      Comment:      Specimen slightly hemolyzed - may falsely elevate result    ALT 34 0 - 45 U/L    Narrative    Fasting Glucose reference range is 70-99 mg/dL per  American Diabetes Association (ADA) guidelines.   Lipid Cascade   Result Value Ref Range    Cholesterol 219 (H) <=199 mg/dL    Triglycerides 169 (H) <=149 mg/dL    HDL Cholesterol 41 >=40 mg/dL    LDL Calculated 144 (H) <=129 mg/dL    Patient Fasting > 8hrs? Yes    PSA, Annual Screen (Prostatic-Specific Antigen)   Result Value Ref Range    PSA 1.8 0.0 - 3.5 ng/mL    Narrative    Method is Abbott Prostate-Specific Antigen (PSA)  Standard-WHO 1st International (90:10)   Hepatitis C Antibody (Anti-HCV)   Result Value Ref Range    Hepatitis C Ab Negative Negative       Labs look good, Pat.  Let's see you back in a year.    Please call with questions or contact us using [employer].    Sincerely,        Electronically signed by Jaime Mason MD

## 2021-08-26 NOTE — LETTER
Letter by Jaime Mason MD at      Author: Jaime Mason MD Service: -- Author Type: --    Filed:  Encounter Date: 8/21/2019 Status: (Other)         Luca Jack  [address]  [place] [address]             August 21, 2019         Dear Mr. Jack,    Our records show that you are due for a colonoscopy.  We do want to inform you that there are a couple of other options besides the traditional colonoscopy that we offer.  If you would like to do the traditional colonoscopy, please contact a [facility] near you according to the card enclosed.  If you would like to do one of the other options available to you, please let you primary doctor know and they will get that ordered.  Enclosed is some information on the other options for you to read over.  If you have had any of these done at another facility, please arrange for us to receive those records so we can update your chart.    Please call with questions or contact us using [employer].    Sincerely,        Electronically signed by Jaime Mason MD       
Other Specify

## 2021-11-29 ENCOUNTER — TELEPHONE (OUTPATIENT)
Dept: INTERNAL MEDICINE | Facility: CLINIC | Age: 56
End: 2021-11-29
Payer: MEDICARE

## 2021-11-29 DIAGNOSIS — I10 ESSENTIAL HYPERTENSION: Primary | ICD-10-CM

## 2021-11-29 RX ORDER — LISINOPRIL AND HYDROCHLOROTHIAZIDE 20; 25 MG/1; MG/1
1 TABLET ORAL DAILY
Qty: 90 TABLET | Refills: 0 | Status: SHIPPED | OUTPATIENT
Start: 2021-11-29 | End: 2022-02-28

## 2021-11-29 RX ORDER — LISINOPRIL AND HYDROCHLOROTHIAZIDE 20; 25 MG/1; MG/1
1 TABLET ORAL DAILY
COMMUNITY
Start: 2021-08-22 | End: 2021-11-29

## 2021-11-29 RX ORDER — LISINOPRIL 20 MG/1
20 TABLET ORAL DAILY
Qty: 90 TABLET | Refills: 0 | Status: SHIPPED | OUTPATIENT
Start: 2021-11-29 | End: 2022-03-28

## 2021-11-29 NOTE — TELEPHONE ENCOUNTER
Reason for Call:  Other prescription    Detailed comments: [facility] called and said they need the   lisinopril (PRINIVIL/ZESTRIL) 2025 20 MG tablet (combination) not the lisinopril (ZESTRIL) 20 MG tablet - please update and send an updated version.    Phone Number can be reached at: Other phone number:  [phone]*    Best Time: any    Can we leave a detailed message on this number? Not Applicable    Call taken on 11/29/2021 at 1:40 PM by Jose Flores

## 2021-11-29 NOTE — TELEPHONE ENCOUNTER
Lisinopril refilled for 90 days.  Patient needs a follow-up appointment in clinic for further refills

## 2021-11-29 NOTE — TELEPHONE ENCOUNTER
..Reason for Call:  Medication or medication refill:    Do you use a [facility]?  Name of the pharmacy and phone number for the current request: [id number] - [place], [place] - [address]  [phone]    Name of the medication requested: lisinopril (PRINIVIL/ZESTRIL) 20 MG tablet     Other request: Per patient mom, pharmacy has sent multiple request for medication. Patient is going on (1) week without taking it.     Can we leave a detailed message on this number? YES    Phone number patient can be reached at: Home number on file [phone] (home)    Best Time: ANY    Call taken on 11/29/2021 at 8:58 AM by RAGHAV Bobo

## 2021-12-29 ENCOUNTER — TRANSFERRED RECORDS (OUTPATIENT)
Dept: HEALTH INFORMATION MANAGEMENT | Facility: CLINIC | Age: 56
End: 2021-12-29
Payer: MEDICARE

## 2021-12-29 LAB
ALT SERPL-CCNC: 19 IU/L (ref 0–44)
AST SERPL-CCNC: 14 IU/L (ref 0–40)
CREATININE (EXTERNAL): 1.15 MG/DL (ref 0.76–1.27)
GFR ESTIMATED (EXTERNAL): 71 ML/MIN/1.73M2
GFR ESTIMATED (IF AFRICAN AMERICAN) (EXTERNAL): 82 ML/MIN/1.73M2
GLUCOSE (EXTERNAL): 94 MG/DL (ref 65–99)
POTASSIUM (EXTERNAL): 4 MMOL/L (ref 3.5–5.2)

## 2022-02-16 DIAGNOSIS — I10 ESSENTIAL HYPERTENSION: ICD-10-CM

## 2022-02-18 NOTE — TELEPHONE ENCOUNTER
"Routing refill request to provider for review/approval because:  Labs not current:  multiple  Patient needs to be seen because it has been more than 1 year since last office visit.    Last Written Prescription Date:  11/29/21  Last Fill Quantity: 90,  # refills: 0   Last office visit provider:  12/14/20     Requested Prescriptions   Pending Prescriptions Disp Refills     lisinopril-hydrochlorothiazide (ZESTORETIC) 20-25 MG tablet [Pharmacy Med Name: Lisinopril-hydroCHLOROthiazide Oral Tablet 20-25 MG] 90 tablet 0     Sig: Take 1 tablet by mouth daily       Diuretics (Including Combos) Protocol Failed - 2/16/2022  2:56 PM        Failed - Blood pressure under 140/90 in past 12 months     BP Readings from Last 3 Encounters:   12/14/20 100/72   09/24/19 (!) 143/99 08/01/19 129/87                 Failed - Recent (12 mo) or future (30 days) visit within the authorizing provider's specialty     Patient has had an office visit with the authorizing provider or a provider within the authorizing providers department within the previous 12 mos or has a future within next 30 days. See \"Patient Info\" tab in inbasket, or \"Choose Columns\" in Meds & Orders section of the refill encounter.              Failed - Normal serum creatinine on file in past 12 months     Recent Labs   Lab Test 12/14/20  1156   CR 1.04              Failed - Normal serum sodium on file in past 12 months     Recent Labs   Lab Test 12/14/20  1156                 Passed - Medication is active on med list        Passed - Patient is age 18 or older        Passed - Normal serum potassium on file in past 12 months     Recent Labs   Lab Test 12/14/20  1156   POTASSIUM 4.5                   ACE Inhibitors (Including Combos) Protocol Failed - 2/16/2022  2:56 PM        Failed - Blood pressure under 140/90 in past 12 months     BP Readings from Last 3 Encounters:   12/14/20 100/72   09/24/19 (!) 143/99   08/01/19 129/87                 Failed - Recent (12 mo) or " "future (30 days) visit within the authorizing provider's specialty     Patient has had an office visit with the authorizing provider or a provider within the authorizing providers department within the previous 12 mos or has a future within next 30 days. See \"Patient Info\" tab in inbasket, or \"Choose Columns\" in Meds & Orders section of the refill encounter.              Failed - Normal serum creatinine on file in past 12 months     Recent Labs   Lab Test 12/14/20  1156   CR 1.04       Ok to refill medication if creatinine is low          Passed - Medication is active on med list        Passed - Patient is age 18 or older        Passed - Normal serum potassium on file in past 12 months     Recent Labs   Lab Test 12/29/21  1341 12/14/20  1156   POTASSIUM  --  4.5   83671 4.0  --                   Zaida Shipman RN 02/18/22 11:12 AM  "

## 2022-02-28 RX ORDER — LISINOPRIL AND HYDROCHLOROTHIAZIDE 20; 25 MG/1; MG/1
1 TABLET ORAL DAILY
Qty: 90 TABLET | Refills: 0 | Status: SHIPPED | OUTPATIENT
Start: 2022-02-28 | End: 2022-06-06

## 2022-02-28 NOTE — TELEPHONE ENCOUNTER
Reason for Call:  Other call back    Detailed comments: Patient is requesting medication to be refilled, can he be bridged until March 18th 2022? appt is set up.    Phone Number Patient can be reached at: Home number on file [phone] (home)    Best Time: any    Can we leave a detailed message on this number? YES    Call taken on 2/28/2022 at 3:01 PM by Jose Flores

## 2022-03-28 ENCOUNTER — OFFICE VISIT (OUTPATIENT)
Dept: INTERNAL MEDICINE | Facility: CLINIC | Age: 57
End: 2022-03-28
Payer: MEDICARE

## 2022-03-28 VITALS
HEIGHT: 66 IN | DIASTOLIC BLOOD PRESSURE: 64 MMHG | WEIGHT: 209.1 LBS | SYSTOLIC BLOOD PRESSURE: 102 MMHG | HEART RATE: 84 BPM | OXYGEN SATURATION: 94 % | BODY MASS INDEX: 33.61 KG/M2

## 2022-03-28 DIAGNOSIS — I10 ESSENTIAL HYPERTENSION: Primary | ICD-10-CM

## 2022-03-28 PROCEDURE — 99214 OFFICE O/P EST MOD 30 MIN: CPT | Performed by: INTERNAL MEDICINE

## 2022-03-28 RX ORDER — BUPRENORPHINE AND NALOXONE 2; .5 MG/1; MG/1
1 FILM, SOLUBLE BUCCAL; SUBLINGUAL DAILY
COMMUNITY
Start: 2020-12-07

## 2022-03-28 RX ORDER — TRAZODONE HYDROCHLORIDE 150 MG/1
150 TABLET ORAL
COMMUNITY
Start: 2020-04-27

## 2022-03-28 ASSESSMENT — PATIENT HEALTH QUESTIONNAIRE - PHQ9
SUM OF ALL RESPONSES TO PHQ QUESTIONS 1-9: 6
SUM OF ALL RESPONSES TO PHQ QUESTIONS 1-9: 6
10. IF YOU CHECKED OFF ANY PROBLEMS, HOW DIFFICULT HAVE THESE PROBLEMS MADE IT FOR YOU TO DO YOUR WORK, TAKE CARE OF THINGS AT HOME, OR GET ALONG WITH OTHER PEOPLE: NOT DIFFICULT AT ALL

## 2022-03-28 NOTE — PROGRESS NOTES
"  Assessment & Plan   Problem List Items Addressed This Visit     Hypertension - Primary           #1.  Hypertension, controlled.  Labs are up-to-date.  We will continue his lisinopril and hydrochlorothiazide.  Follow-up 1 year.    2.  Anemia.  I told him that getting his colonoscopy would absolutely be something that we would want to do in light of his anemia.  I explained that even though this is mild it is lower than what a 56-year-old relatively healthy male should have.  He is going to take this recommendation under advisement.  He will contact us if he wants to go through the procedure.         No follow-ups on file.    Jaime Mason MD  [facility]   Luca comes in today for follow-up of his hypertension.  He is currently on both lisinopril and hydrochlorothiazide and is tolerating these well.  Blood pressure is actually slightly hypotensive today, but he denies any dizziness with standing nor any significant fatigue.  He is taking his medication on a regular basis.  He is otherwise feeling well today, except he states that he gets some occasional tingling in the fourth and fifth digits of the left hand.  When he repositions his arm or shakes it out it tends to get better.  He is otherwise doing well.  His labs were checked at an outside facility last month, and his renal function and hepatic function were normal on 2/11.  Hemoglobin was slightly decreased at 12.5, but otherwise his indices were normal.  MCV was 86.  He denies any blood in his stool or black tarry stools.  He has never had a colonoscopy before and has always been hesitant to get one.      Objective    /64 (BP Location: Right arm, Patient Position: Sitting, Cuff Size: Adult Large)   Pulse 84   Ht 1.676 m (5' 6\")   Wt 94.8 kg (209 lb 1.6 oz)   SpO2 94%   BMI 33.75 kg/m    Body mass index is 33.75 kg/m .  Physical Exam       "

## 2022-03-29 ASSESSMENT — PATIENT HEALTH QUESTIONNAIRE - PHQ9: SUM OF ALL RESPONSES TO PHQ QUESTIONS 1-9: 6

## 2022-04-13 ENCOUNTER — TELEPHONE (OUTPATIENT)
Dept: INTERNAL MEDICINE | Facility: CLINIC | Age: 57
End: 2022-04-13
Payer: MEDICARE

## 2022-04-13 DIAGNOSIS — R20.2 TINGLING OF LEFT UPPER EXTREMITY: Primary | ICD-10-CM

## 2022-04-13 NOTE — TELEPHONE ENCOUNTER
Patient's mother called very upset about the care of patient at the office visit. Mother is asking for a referral to Cardiology for patient. Please advise

## 2022-04-14 NOTE — TELEPHONE ENCOUNTER
Message was left for patient to call back, if patient calls back please inform him that referral for cardiology was placed by Dr. Mason.

## 2022-04-14 NOTE — TELEPHONE ENCOUNTER
Per patient's mother patient is having tingling sensation in left arm and hand. Requesting to see cardiology for this issue. Patient tried to schedule a cardiology appointment and was told that a referral is needed. Please advise.

## 2022-05-04 ENCOUNTER — OFFICE VISIT (OUTPATIENT)
Dept: CARDIOLOGY | Facility: CLINIC | Age: 57
End: 2022-05-04
Attending: INTERNAL MEDICINE
Payer: MEDICARE

## 2022-05-04 VITALS
BODY MASS INDEX: 33.75 KG/M2 | SYSTOLIC BLOOD PRESSURE: 106 MMHG | RESPIRATION RATE: 16 BRPM | HEART RATE: 83 BPM | WEIGHT: 210 LBS | HEIGHT: 66 IN | DIASTOLIC BLOOD PRESSURE: 66 MMHG

## 2022-05-04 DIAGNOSIS — R20.2 TINGLING OF LEFT UPPER EXTREMITY: ICD-10-CM

## 2022-05-04 DIAGNOSIS — R06.09 DYSPNEA ON EXERTION: Primary | ICD-10-CM

## 2022-05-04 PROCEDURE — 99204 OFFICE O/P NEW MOD 45 MIN: CPT | Performed by: INTERNAL MEDICINE

## 2022-05-04 PROCEDURE — 93000 ELECTROCARDIOGRAM COMPLETE: CPT | Performed by: INTERNAL MEDICINE
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
## 2022-05-06 LAB
ATRIAL RATE - MUSE: 70 BPM
DIASTOLIC BLOOD PRESSURE - MUSE: NORMAL MMHG
INTERPRETATION ECG - MUSE: NORMAL
P AXIS - MUSE: 13 DEGREES
PR INTERVAL - MUSE: 184 MS
QRS DURATION - MUSE: 84 MS
QT - MUSE: 396 MS
QTC - MUSE: 427 MS
R AXIS - MUSE: 33 DEGREES
SYSTOLIC BLOOD PRESSURE - MUSE: NORMAL MMHG
T AXIS - MUSE: 40 DEGREES
VENTRICULAR RATE- MUSE: 70 BPM

## 2022-06-05 DIAGNOSIS — I10 ESSENTIAL HYPERTENSION: ICD-10-CM

## 2022-06-06 RX ORDER — LISINOPRIL AND HYDROCHLOROTHIAZIDE 20; 25 MG/1; MG/1
1 TABLET ORAL DAILY
Qty: 90 TABLET | Refills: 0 | Status: SHIPPED | OUTPATIENT
Start: 2022-06-06 | End: 2022-09-20

## 2022-06-06 NOTE — TELEPHONE ENCOUNTER
"Routing refill request to provider for review/approval because:  Labs not current:      Last Written Prescription Date:  2/28/22  Last Fill Quantity: 90,  # refills: 0   Last office visit provider:  3/28/22     Requested Prescriptions   Pending Prescriptions Disp Refills     lisinopril-hydrochlorothiazide (ZESTORETIC) 20-25 MG tablet [Pharmacy Med Name: Lisinopril-hydroCHLOROthiazide Oral Tablet 20-25 MG] 90 tablet 0     Sig: Take 1 tablet by mouth daily.       Diuretics (Including Combos) Protocol Failed - 6/5/2022 10:23 AM        Failed - Normal serum creatinine on file in past 12 months     Recent Labs   Lab Test 12/14/20  1156   CR 1.04              Failed - Normal serum sodium on file in past 12 months     Recent Labs   Lab Test 12/14/20  1156                 Passed - Blood pressure under 140/90 in past 12 months     BP Readings from Last 3 Encounters:   05/04/22 106/66   03/28/22 102/64   12/14/20 100/72                 Passed - Recent (12 mo) or future (30 days) visit within the authorizing provider's specialty     Patient has had an office visit with the authorizing provider or a provider within the authorizing providers department within the previous 12 mos or has a future within next 30 days. See \"Patient Info\" tab in inbasket, or \"Choose Columns\" in Meds & Orders section of the refill encounter.              Passed - Medication is active on med list        Passed - Patient is age 18 or older        Passed - Normal serum potassium on file in past 12 months     Recent Labs   Lab Test 12/14/20  1156   POTASSIUM 4.5                   ACE Inhibitors (Including Combos) Protocol Failed - 6/5/2022 10:23 AM        Failed - Normal serum creatinine on file in past 12 months     Recent Labs   Lab Test 12/14/20  1156   CR 1.04       Ok to refill medication if creatinine is low          Passed - Blood pressure under 140/90 in past 12 months     BP Readings from Last 3 Encounters:   05/04/22 106/66   03/28/22 " "102/64   12/14/20 100/72                 Passed - Recent (12 mo) or future (30 days) visit within the authorizing provider's specialty     Patient has had an office visit with the authorizing provider or a provider within the authorizing providers department within the previous 12 mos or has a future within next 30 days. See \"Patient Info\" tab in inbasket, or \"Choose Columns\" in Meds & Orders section of the refill encounter.              Passed - Medication is active on med list        Passed - Patient is age 18 or older        Passed - Normal serum potassium on file in past 12 months     Recent Labs   Lab Test 12/29/21  1341 12/14/20  1156   POTASSIUM  --  4.5   23826 4.0  --                   Anne-Marie Gonzalez RN 06/05/22 9:16 PM  "

## 2022-09-18 ENCOUNTER — NURSE TRIAGE (OUTPATIENT)
Dept: NURSING | Facility: CLINIC | Age: 57
End: 2022-09-18

## 2022-09-18 DIAGNOSIS — I10 ESSENTIAL HYPERTENSION: ICD-10-CM

## 2022-09-18 NOTE — TELEPHONE ENCOUNTER
Calling to check if patient went to the ER. Or called 911. Mom reports patient did not want to go to the ER, so he went to the pharmacy to get BP pills, and took his BP pills, and she will schedule an appointment for him tomorrow. Mom was advised to call back if he experiences symptoms again, or if she has further questions. Mom verbalized understanding.      Zac Croft RN on 9/18/2022 at 4:27 PM

## 2022-09-18 NOTE — TELEPHONE ENCOUNTER
"Is this a 2nd Level Triage? NO    Situation: Chest pain, and tightness, lightheadedness, dizziness, shortness of breath.     Background: Episode of chest pain, dizziness and shortness of breath about a half hour ago that lasted about 15 minutes.     Assessment: Pt had an episode about a half hour ago that lasted about 15 minutes. Pt started having chest pain while laying down and felt like he could not breathe. Pt felt very warm and light headed, and his chest felt very tight. Pt reports pain was about a \"4\". Pt took his BP after the episode, and it was 146/91. Pt then finally stood up, and started to feel better. Pt denies these symptoms now.     Protocol Recommended Disposition:   Call 911    Recommendation:   Per protocol pt should call 911. Pt will have his mom drive him since he is no longer having this episode. Pt was advised if he started to have symptoms again he needs to call 911.   Pt verbalized understanding.      Zac Croft RN on 9/18/2022 at 2:56 PM      Reason for Disposition    [1] Chest pain lasts > 5 minutes AND [2] age > 44    Additional Information    Negative: SEVERE difficulty breathing (e.g., struggling for each breath, speaks in single words)    Negative: Difficult to awaken or acting confused (e.g., disoriented, slurred speech)    Negative: Shock suspected (e.g., cold/pale/clammy skin, too weak to stand, low BP, rapid pulse)    Negative: Passed out (i.e., lost consciousness, collapsed and was not responding)    Protocols used: CHEST PAIN-A-AH      "

## 2022-09-20 RX ORDER — LISINOPRIL AND HYDROCHLOROTHIAZIDE 20; 25 MG/1; MG/1
TABLET ORAL
Qty: 90 TABLET | Refills: 0 | Status: SHIPPED | OUTPATIENT
Start: 2022-09-20 | End: 2022-11-12

## 2022-09-20 NOTE — TELEPHONE ENCOUNTER
"Routing refill request to provider for review/approval because:  Labs not current:  Multiple    Last Written Prescription Date:  6/6/22  Last Fill Quantity: 90,  # refills: 0   Last office visit provider:  3/28/22     Requested Prescriptions   Pending Prescriptions Disp Refills     lisinopril-hydrochlorothiazide (ZESTORETIC) 20-25 MG tablet [Pharmacy Med Name: Lisinopril-hydroCHLOROthiazide Oral Tablet 20-25 MG] 90 tablet 0     Sig: TAKE ONE TABLET BY MOUTH ONE TIME DAILY       Diuretics (Including Combos) Protocol Failed - 9/20/2022  9:37 AM        Failed - Normal serum creatinine on file in past 12 months     Recent Labs   Lab Test 12/14/20  1156   CR 1.04              Failed - Normal serum sodium on file in past 12 months     Recent Labs   Lab Test 12/14/20  1156                 Passed - Blood pressure under 140/90 in past 12 months     BP Readings from Last 3 Encounters:   05/04/22 106/66   03/28/22 102/64   12/14/20 100/72                 Passed - Recent (12 mo) or future (30 days) visit within the authorizing provider's specialty     Patient has had an office visit with the authorizing provider or a provider within the authorizing providers department within the previous 12 mos or has a future within next 30 days. See \"Patient Info\" tab in inbasket, or \"Choose Columns\" in Meds & Orders section of the refill encounter.              Passed - Medication is active on med list        Passed - Patient is age 18 or older        Passed - Normal serum potassium on file in past 12 months     Recent Labs   Lab Test 12/14/20  1156   POTASSIUM 4.5                   ACE Inhibitors (Including Combos) Protocol Failed - 9/20/2022  9:37 AM        Failed - Normal serum creatinine on file in past 12 months     Recent Labs   Lab Test 12/14/20  1156   CR 1.04       Ok to refill medication if creatinine is low          Passed - Blood pressure under 140/90 in past 12 months     BP Readings from Last 3 Encounters:   05/04/22 " "106/66   03/28/22 102/64   12/14/20 100/72                 Passed - Recent (12 mo) or future (30 days) visit within the authorizing provider's specialty     Patient has had an office visit with the authorizing provider or a provider within the authorizing providers department within the previous 12 mos or has a future within next 30 days. See \"Patient Info\" tab in inbasket, or \"Choose Columns\" in Meds & Orders section of the refill encounter.              Passed - Medication is active on med list        Passed - Patient is age 18 or older        Passed - Normal serum potassium on file in past 12 months     Recent Labs   Lab Test 12/29/21  1341 12/14/20  1156   POTASSIUM  --  4.5   06485 4.0  --                   Deniz Valenzuela RN 09/20/22 9:37 AM  "

## 2022-11-11 DIAGNOSIS — I10 ESSENTIAL HYPERTENSION: ICD-10-CM

## 2022-11-12 RX ORDER — LISINOPRIL AND HYDROCHLOROTHIAZIDE 20; 25 MG/1; MG/1
TABLET ORAL
Qty: 90 TABLET | Refills: 0 | Status: SHIPPED | OUTPATIENT
Start: 2022-11-12 | End: 2023-03-29

## 2022-11-12 NOTE — TELEPHONE ENCOUNTER
"Routing refill request to provider for review/approval because:  Labs not current:  Cr na    Last Written Prescription Date:  9/20/22  Last Fill Quantity: 90,  # refills: 0   Last office visit provider:  3/28/22     Requested Prescriptions   Pending Prescriptions Disp Refills     lisinopril-hydrochlorothiazide (ZESTORETIC) 20-25 MG tablet [Pharmacy Med Name: Lisinopril-hydroCHLOROthiazide Oral Tablet 20-25 MG] 90 tablet 0     Sig: TAKE ONE TABLET BY MOUTH ONE TIME DAILY       Diuretics (Including Combos) Protocol Failed - 11/11/2022 11:52 AM        Failed - Normal serum creatinine on file in past 12 months     Recent Labs   Lab Test 12/14/20  1156   CR 1.04              Failed - Normal serum sodium on file in past 12 months     Recent Labs   Lab Test 12/14/20  1156                 Passed - Blood pressure under 140/90 in past 12 months     BP Readings from Last 3 Encounters:   05/04/22 106/66   03/28/22 102/64   12/14/20 100/72                 Passed - Recent (12 mo) or future (30 days) visit within the authorizing provider's specialty     Patient has had an office visit with the authorizing provider or a provider within the authorizing providers department within the previous 12 mos or has a future within next 30 days. See \"Patient Info\" tab in inbasket, or \"Choose Columns\" in Meds & Orders section of the refill encounter.              Passed - Medication is active on med list        Passed - Patient is age 18 or older        Passed - Normal serum potassium on file in past 12 months     Recent Labs   Lab Test 12/14/20  1156   POTASSIUM 4.5                   ACE Inhibitors (Including Combos) Protocol Failed - 11/11/2022 11:52 AM        Failed - Normal serum creatinine on file in past 12 months     Recent Labs   Lab Test 12/14/20  1156   CR 1.04       Ok to refill medication if creatinine is low          Passed - Blood pressure under 140/90 in past 12 months     BP Readings from Last 3 Encounters:   05/04/22 " "106/66   03/28/22 102/64   12/14/20 100/72                 Passed - Recent (12 mo) or future (30 days) visit within the authorizing provider's specialty     Patient has had an office visit with the authorizing provider or a provider within the authorizing providers department within the previous 12 mos or has a future within next 30 days. See \"Patient Info\" tab in inbasket, or \"Choose Columns\" in Meds & Orders section of the refill encounter.              Passed - Medication is active on med list        Passed - Patient is age 18 or older        Passed - Normal serum potassium on file in past 12 months     Recent Labs   Lab Test 12/29/21  1341 12/14/20  1156   POTASSIUM  --  4.5   73730 4.0  --                   Anne-Marie Gonzalez RN 11/12/22 5:11 PM  "

## 2023-03-09 ENCOUNTER — TRANSFERRED RECORDS (OUTPATIENT)
Dept: HEALTH INFORMATION MANAGEMENT | Facility: CLINIC | Age: 58
End: 2023-03-09

## 2023-03-29 DIAGNOSIS — I10 ESSENTIAL HYPERTENSION: ICD-10-CM

## 2023-03-30 RX ORDER — LISINOPRIL AND HYDROCHLOROTHIAZIDE 20; 25 MG/1; MG/1
1 TABLET ORAL DAILY
Qty: 90 TABLET | Refills: 0 | Status: SHIPPED | OUTPATIENT
Start: 2023-03-30 | End: 2023-07-14

## 2023-03-30 NOTE — TELEPHONE ENCOUNTER
"Routing refill request to provider for review/approval because:  Labs not current:  multiple  Patient needs to be seen because it has been more than 1 year since last office visit.    Last Written Prescription Date:  11/12/2022  Last Fill Quantity: 90,  # refills: 0   Last office visit provider:  3/28/2022     Requested Prescriptions   Pending Prescriptions Disp Refills     lisinopril-hydrochlorothiazide (ZESTORETIC) 20-25 MG tablet 90 tablet 0     Sig: Take 1 tablet by mouth daily       Diuretics (Including Combos) Protocol Failed - 3/29/2023  2:57 PM        Failed - Recent (12 mo) or future (30 days) visit within the authorizing provider's specialty     Patient has had an office visit with the authorizing provider or a provider within the authorizing providers department within the previous 12 mos or has a future within next 30 days. See \"Patient Info\" tab in inbasket, or \"Choose Columns\" in Meds & Orders section of the refill encounter.              Failed - Normal serum creatinine on file in past 12 months     Recent Labs   Lab Test 12/14/20  1156   CR 1.04              Failed - Normal serum potassium on file in past 12 months     Recent Labs   Lab Test 12/14/20  1156   POTASSIUM 4.5                    Failed - Normal serum sodium on file in past 12 months     Recent Labs   Lab Test 12/14/20  1156                 Passed - Blood pressure under 140/90 in past 12 months     BP Readings from Last 3 Encounters:   05/04/22 106/66   03/28/22 102/64   12/14/20 100/72                 Passed - Medication is active on med list        Passed - Patient is age 18 or older       ACE Inhibitors (Including Combos) Protocol Failed - 3/29/2023  2:57 PM        Failed - Recent (12 mo) or future (30 days) visit within the authorizing provider's specialty     Patient has had an office visit with the authorizing provider or a provider within the authorizing providers department within the previous 12 mos or has a future within " "next 30 days. See \"Patient Info\" tab in inbasket, or \"Choose Columns\" in Meds & Orders section of the refill encounter.              Failed - Normal serum creatinine on file in past 12 months     Recent Labs   Lab Test 12/14/20  1156   CR 1.04       Ok to refill medication if creatinine is low          Failed - Normal serum potassium on file in past 12 months     Recent Labs   Lab Test 12/29/21  1341 12/14/20  1156   POTASSIUM  --  4.5   46628 4.0  --              Passed - Blood pressure under 140/90 in past 12 months     BP Readings from Last 3 Encounters:   05/04/22 106/66   03/28/22 102/64   12/14/20 100/72                 Passed - Medication is active on med list        Passed - Patient is age 18 or older             Marylin Velasquez, RN 03/30/23 2:13 PM      "

## 2023-07-13 DIAGNOSIS — I10 ESSENTIAL HYPERTENSION: ICD-10-CM

## 2023-07-14 RX ORDER — LISINOPRIL AND HYDROCHLOROTHIAZIDE 20; 25 MG/1; MG/1
1 TABLET ORAL DAILY
Qty: 90 TABLET | Refills: 0 | Status: SHIPPED | OUTPATIENT
Start: 2023-07-14 | End: 2023-11-01

## 2023-07-14 NOTE — TELEPHONE ENCOUNTER
"Routing refill request to provider for review/approval because:  Labs not current:  BP, CR, K, Na  Patient needs to be seen because it has been more than 1 year since last office visit.    Last Written Prescription Date:  3/30/23  Last Fill Quantity: 90,  # refills: 0   Last office visit provider:   3/28/22    Requested Prescriptions   Pending Prescriptions Disp Refills    lisinopril-hydrochlorothiazide (ZESTORETIC) 20-25 MG tablet [Pharmacy Med Name: Lisinopril-hydroCHLOROthiazide Oral Tablet 20-25 MG] 90 tablet 0     Sig: Take 1 tablet by mouth daily       Diuretics (Including Combos) Protocol Failed - 7/13/2023  3:36 PM        Failed - Blood pressure under 140/90 in past 12 months     BP Readings from Last 3 Encounters:   05/04/22 106/66 03/28/22 102/64   12/14/20 100/72                 Failed - Recent (12 mo) or future (30 days) visit within the authorizing provider's specialty     Patient has had an office visit with the authorizing provider or a provider within the authorizing providers department within the previous 12 mos or has a future within next 30 days. See \"Patient Info\" tab in inbasket, or \"Choose Columns\" in Meds & Orders section of the refill encounter.              Failed - Normal serum creatinine on file in past 12 months     Recent Labs   Lab Test 12/14/20  1156   CR 1.04              Failed - Normal serum potassium on file in past 12 months     Recent Labs   Lab Test 12/14/20  1156   POTASSIUM 4.5                    Failed - Normal serum sodium on file in past 12 months     Recent Labs   Lab Test 12/14/20  1156                 Passed - Medication is active on med list        Passed - Patient is age 18 or older       ACE Inhibitors (Including Combos) Protocol Failed - 7/13/2023  3:36 PM        Failed - Blood pressure under 140/90 in past 12 months     BP Readings from Last 3 Encounters:   05/04/22 106/66   03/28/22 102/64   12/14/20 100/72                 Failed - Recent (12 mo) or future " "(30 days) visit within the authorizing provider's specialty     Patient has had an office visit with the authorizing provider or a provider within the authorizing providers department within the previous 12 mos or has a future within next 30 days. See \"Patient Info\" tab in inbasket, or \"Choose Columns\" in Meds & Orders section of the refill encounter.              Failed - Normal serum creatinine on file in past 12 months     Recent Labs   Lab Test 12/14/20  1156   CR 1.04       Ok to refill medication if creatinine is low          Failed - Normal serum potassium on file in past 12 months     Recent Labs   Lab Test 12/29/21  1341 12/14/20  1156   POTASSIUM  --  4.5   52750 4.0  --              Passed - Medication is active on med list        Passed - Patient is age 18 or older             Kamini Little RN 07/13/23 9:11 PM  "

## 2023-10-13 DIAGNOSIS — I10 ESSENTIAL HYPERTENSION: ICD-10-CM

## 2023-10-13 RX ORDER — LISINOPRIL AND HYDROCHLOROTHIAZIDE 20; 25 MG/1; MG/1
1 TABLET ORAL DAILY
Qty: 90 TABLET | Refills: 0 | OUTPATIENT
Start: 2023-10-13

## 2023-10-28 DIAGNOSIS — I10 ESSENTIAL HYPERTENSION: ICD-10-CM

## 2023-11-01 RX ORDER — LISINOPRIL AND HYDROCHLOROTHIAZIDE 20; 25 MG/1; MG/1
1 TABLET ORAL DAILY
Qty: 90 TABLET | Refills: 0 | Status: SHIPPED | OUTPATIENT
Start: 2023-11-01 | End: 2024-01-29

## 2023-11-01 NOTE — TELEPHONE ENCOUNTER
Talked with patient. Scheduled for an est care appointment and med check for 11/27 with Aurelia. Ok to be bridged until then?

## 2024-01-08 ENCOUNTER — OFFICE VISIT (OUTPATIENT)
Dept: INTERNAL MEDICINE | Facility: CLINIC | Age: 59
End: 2024-01-08
Payer: MEDICARE

## 2024-01-08 VITALS
TEMPERATURE: 98.3 F | RESPIRATION RATE: 16 BRPM | DIASTOLIC BLOOD PRESSURE: 58 MMHG | WEIGHT: 208 LBS | SYSTOLIC BLOOD PRESSURE: 104 MMHG | HEIGHT: 66 IN | OXYGEN SATURATION: 94 % | HEART RATE: 111 BPM | BODY MASS INDEX: 33.43 KG/M2

## 2024-01-08 DIAGNOSIS — D50.9 IRON DEFICIENCY ANEMIA, UNSPECIFIED IRON DEFICIENCY ANEMIA TYPE: ICD-10-CM

## 2024-01-08 DIAGNOSIS — I10 ESSENTIAL HYPERTENSION: ICD-10-CM

## 2024-01-08 DIAGNOSIS — Z12.11 SCREEN FOR COLON CANCER: Primary | ICD-10-CM

## 2024-01-08 DIAGNOSIS — G89.29 OTHER CHRONIC PAIN: ICD-10-CM

## 2024-01-08 PROCEDURE — 80048 BASIC METABOLIC PNL TOTAL CA: CPT | Performed by: NURSE PRACTITIONER

## 2024-01-08 PROCEDURE — 36415 COLL VENOUS BLD VENIPUNCTURE: CPT | Performed by: NURSE PRACTITIONER

## 2024-01-08 PROCEDURE — 99214 OFFICE O/P EST MOD 30 MIN: CPT | Performed by: NURSE PRACTITIONER

## 2024-01-08 ASSESSMENT — PATIENT HEALTH QUESTIONNAIRE - PHQ9
10. IF YOU CHECKED OFF ANY PROBLEMS, HOW DIFFICULT HAVE THESE PROBLEMS MADE IT FOR YOU TO DO YOUR WORK, TAKE CARE OF THINGS AT HOME, OR GET ALONG WITH OTHER PEOPLE: NOT DIFFICULT AT ALL
SUM OF ALL RESPONSES TO PHQ QUESTIONS 1-9: 5
SUM OF ALL RESPONSES TO PHQ QUESTIONS 1-9: 5

## 2024-01-08 NOTE — LETTER
January 9, 2024      Luca SILVERMAN Marcie  [address] [facility] [address]        Dear ,    We are writing to inform you of your test results.    Kidney labs were mildly elevated at your appointment yesterday, likely as a result of a combination of dehydration and lisinopril-hydrochlorothiazide BP med.    With your blood pressure being on the lower side of normal, we may want to discuss reducing your blood pressure medication a bit.  This may improve your kidney labs.  If this is something that interests you, please call the clinic so that we can come up with a plan moving forward.    I recommend that you get a colonoscopy.    Resulted Orders   Basic metabolic panel   Result Value Ref Range    Sodium 139 135 - 145 mmol/L      Comment:      Reference intervals for this test were updated on 09/26/2023 to more accurately reflect our healthy population. There may be differences in the flagging of prior results with similar values performed with this method. Interpretation of those prior results can be made in the context of the updated reference intervals.     Potassium 3.6 3.4 - 5.3 mmol/L    Chloride 100 98 - 107 mmol/L    Carbon Dioxide (CO2) 26 22 - 29 mmol/L    Anion Gap 13 7 - 15 mmol/L    Urea Nitrogen 23.6 (H) 6.0 - 20.0 mg/dL    Creatinine 1.28 (H) 0.67 - 1.17 mg/dL    GFR Estimate 65 >60 mL/min/1.73m2    Calcium 9.7 8.6 - 10.0 mg/dL    Glucose 116 (H) 70 - 99 mg/dL       If you have any questions or concerns, please call the clinic at the number listed above.       Sincerely,      Timo Moses, CNP

## 2024-01-08 NOTE — PROGRESS NOTES
"  Assessment & Plan     Hypertension  Controlled on lisinopril-hydrochlorothiazide.  He is due for [facility].  - Basic metabolic panel; Future    Iron deficiency anemia, unspecified iron deficiency anemia type  I suggested that we recheck his hemoglobin today.  He politely declined    Screen for colon cancer  He is not interested in any type of colon cancer screening tests    Chronic Pain  Controlled on Suboxone    Patient Instructions   Blood pressure looks okay today on current regimen.    Recheck kidney labs and electrolytes.    I recommend a screening colonoscopy but you declined.    I recommend we recheck your hemoglobin at some point.      Timo Moses, CNP  [facility]   Luca is a 58 year old, presenting for the following health issues:    Establish Care (Former pt of Jaime Mason - no concerns)      1/8/2024     1:20 PM   Additional Questions   Roomed by Breanna GUDINO     Here today because he needs refills of his blood pressure medications.  He is not interested in any health maintenance tests or screenings.  He declines colonoscopy.  He does not want to recheck his hemoglobin today      Constitutional, HEENT, cardiovascular, pulmonary, gi and gu systems are negative, except as otherwise noted.      Objective    /58 (BP Location: Right arm, Patient Position: Sitting, Cuff Size: Adult Large)   Pulse 111   Temp 98.3  F (36.8  C)   Resp 16   Ht 1.676 m (5' 6\")   Wt 94.3 kg (208 lb)   SpO2 94%   BMI 33.57 kg/m    Body mass index is 33.57 kg/m .    GENERAL: healthy, alert and no distress  NECK: no adenopathy, no asymmetry, masses, or scars and thyroid normal to palpation  RESP: lungs clear to auscultation - no rales, rhonchi or wheezes  CV: regular rate and rhythm, normal S1 S2, no S3 or S4, no murmur, click or rub, no peripheral edema and peripheral pulses strong  ABDOMEN: soft, nontender, no hepatosplenomegaly, no masses and bowel sounds normal  MS: no " gross musculoskeletal defects noted, no edema

## 2024-01-08 NOTE — PATIENT INSTRUCTIONS
Blood pressure looks okay today on current regimen.    Recheck kidney labs and electrolytes.    I recommend a screening colonoscopy but you declined.    I recommend we recheck your hemoglobin at some point.

## 2024-01-09 LAB
ANION GAP SERPL CALCULATED.3IONS-SCNC: 13 MMOL/L (ref 7–15)
BUN SERPL-MCNC: 23.6 MG/DL (ref 6–20)
CALCIUM SERPL-MCNC: 9.7 MG/DL (ref 8.6–10)
CHLORIDE SERPL-SCNC: 100 MMOL/L (ref 98–107)
CREAT SERPL-MCNC: 1.28 MG/DL (ref 0.67–1.17)
DEPRECATED HCO3 PLAS-SCNC: 26 MMOL/L (ref 22–29)
EGFRCR SERPLBLD CKD-EPI 2021: 65 ML/MIN/1.73M2
GLUCOSE SERPL-MCNC: 116 MG/DL (ref 70–99)
POTASSIUM SERPL-SCNC: 3.6 MMOL/L (ref 3.4–5.3)
SODIUM SERPL-SCNC: 139 MMOL/L (ref 135–145)

## 2024-01-28 DIAGNOSIS — I10 ESSENTIAL HYPERTENSION: ICD-10-CM

## 2024-01-29 RX ORDER — LISINOPRIL AND HYDROCHLOROTHIAZIDE 20; 25 MG/1; MG/1
1 TABLET ORAL DAILY
Qty: 90 TABLET | Refills: 0 | Status: SHIPPED | OUTPATIENT
Start: 2024-01-29 | End: 2024-05-02

## 2024-05-02 DIAGNOSIS — I10 ESSENTIAL HYPERTENSION: ICD-10-CM

## 2024-05-03 RX ORDER — LISINOPRIL AND HYDROCHLOROTHIAZIDE 20; 25 MG/1; MG/1
1 TABLET ORAL DAILY
Qty: 90 TABLET | Refills: 3 | Status: SHIPPED | OUTPATIENT
Start: 2024-05-03

## 2024-11-12 ENCOUNTER — TRANSFERRED RECORDS (OUTPATIENT)
Dept: HEALTH INFORMATION MANAGEMENT | Facility: CLINIC | Age: 59
End: 2024-11-12
Payer: MEDICARE

## (undated) DEVICE — TRAY PAIN INJECTION 97A 640

## (undated) DEVICE — CANNULA MONOPOLAR CVD 100ML 20GA 0406-630-125

## (undated) DEVICE — GLOVE ESTEEM POWDER FREE 7.0  2D72PL70

## (undated) DEVICE — PREP CHLORAPREP W/ORANGE TINT 10.5ML 260715

## (undated) DEVICE — CANNULA MONOPOLAR CVD 50ML 20GA 0406-630-015

## (undated) DEVICE — ESU CANNULA RF MONOPLOAR CVD 20GA 10X150MM 0406-630-225

## (undated) RX ORDER — FENTANYL CITRATE 50 UG/ML
INJECTION, SOLUTION INTRAMUSCULAR; INTRAVENOUS
Status: DISPENSED
Start: 2019-06-17